# Patient Record
Sex: MALE | NOT HISPANIC OR LATINO | Employment: FULL TIME | URBAN - METROPOLITAN AREA
[De-identification: names, ages, dates, MRNs, and addresses within clinical notes are randomized per-mention and may not be internally consistent; named-entity substitution may affect disease eponyms.]

---

## 2022-09-07 ENCOUNTER — APPOINTMENT (OUTPATIENT)
Dept: GENERAL RADIOLOGY | Facility: CLINIC | Age: 47
DRG: 501 | End: 2022-09-07
Attending: EMERGENCY MEDICINE

## 2022-09-07 ENCOUNTER — HOSPITAL ENCOUNTER (INPATIENT)
Facility: CLINIC | Age: 47
LOS: 3 days | Discharge: HOME | DRG: 501 | End: 2022-09-10
Attending: EMERGENCY MEDICINE | Admitting: INTERNAL MEDICINE

## 2022-09-07 DIAGNOSIS — Z11.52 ENCOUNTER FOR SCREENING LABORATORY TESTING FOR SEVERE ACUTE RESPIRATORY SYNDROME CORONAVIRUS 2 (SARS-COV-2): ICD-10-CM

## 2022-09-07 DIAGNOSIS — L03.113 CELLULITIS OF RIGHT UPPER EXTREMITY: ICD-10-CM

## 2022-09-07 DIAGNOSIS — M71.121 SEPTIC OLECRANON BURSITIS OF RIGHT ELBOW: Primary | ICD-10-CM

## 2022-09-07 LAB
ALBUMIN SERPL-MCNC: 3.4 G/DL (ref 3.4–5)
ALP SERPL-CCNC: 62 U/L (ref 40–150)
ALT SERPL W P-5'-P-CCNC: 24 U/L (ref 0–70)
ANION GAP SERPL CALCULATED.3IONS-SCNC: 7 MMOL/L (ref 3–14)
AST SERPL W P-5'-P-CCNC: 25 U/L (ref 0–45)
BASOPHILS # BLD AUTO: 0 10E3/UL (ref 0–0.2)
BASOPHILS NFR BLD AUTO: 0 %
BILIRUB SERPL-MCNC: 1.2 MG/DL (ref 0.2–1.3)
BUN SERPL-MCNC: 18 MG/DL (ref 7–30)
CALCIUM SERPL-MCNC: 8.3 MG/DL (ref 8.5–10.1)
CHLORIDE BLD-SCNC: 104 MMOL/L (ref 94–109)
CO2 SERPL-SCNC: 26 MMOL/L (ref 20–32)
CREAT SERPL-MCNC: 1.07 MG/DL (ref 0.66–1.25)
EOSINOPHIL # BLD AUTO: 0.1 10E3/UL (ref 0–0.7)
EOSINOPHIL NFR BLD AUTO: 1 %
ERYTHROCYTE [DISTWIDTH] IN BLOOD BY AUTOMATED COUNT: 13.2 % (ref 10–15)
GFR SERPL CREATININE-BSD FRML MDRD: 85 ML/MIN/1.73M2
GLUCOSE BLD-MCNC: 123 MG/DL (ref 70–99)
HCT VFR BLD AUTO: 39.5 % (ref 40–53)
HGB BLD-MCNC: 13.1 G/DL (ref 13.3–17.7)
IMM GRANULOCYTES # BLD: 0.1 10E3/UL
IMM GRANULOCYTES NFR BLD: 0 %
LACTATE SERPL-SCNC: 1.6 MMOL/L (ref 0.7–2)
LYMPHOCYTES # BLD AUTO: 1.5 10E3/UL (ref 0.8–5.3)
LYMPHOCYTES NFR BLD AUTO: 10 %
MCH RBC QN AUTO: 30.6 PG (ref 26.5–33)
MCHC RBC AUTO-ENTMCNC: 33.2 G/DL (ref 31.5–36.5)
MCV RBC AUTO: 92 FL (ref 78–100)
MONOCYTES # BLD AUTO: 1.5 10E3/UL (ref 0–1.3)
MONOCYTES NFR BLD AUTO: 9 %
NEUTROPHILS # BLD AUTO: 12.3 10E3/UL (ref 1.6–8.3)
NEUTROPHILS NFR BLD AUTO: 80 %
NRBC # BLD AUTO: 0 10E3/UL
NRBC BLD AUTO-RTO: 0 /100
PLATELET # BLD AUTO: 283 10E3/UL (ref 150–450)
POTASSIUM BLD-SCNC: 3.2 MMOL/L (ref 3.4–5.3)
PROT SERPL-MCNC: 6.9 G/DL (ref 6.8–8.8)
RBC # BLD AUTO: 4.28 10E6/UL (ref 4.4–5.9)
SARS-COV-2 RNA RESP QL NAA+PROBE: NEGATIVE
SODIUM SERPL-SCNC: 137 MMOL/L (ref 133–144)
WBC # BLD AUTO: 15.4 10E3/UL (ref 4–11)

## 2022-09-07 PROCEDURE — 250N000011 HC RX IP 250 OP 636: Performed by: EMERGENCY MEDICINE

## 2022-09-07 PROCEDURE — 99285 EMERGENCY DEPT VISIT HI MDM: CPT | Mod: 25 | Performed by: EMERGENCY MEDICINE

## 2022-09-07 PROCEDURE — 87040 BLOOD CULTURE FOR BACTERIA: CPT | Performed by: EMERGENCY MEDICINE

## 2022-09-07 PROCEDURE — 73070 X-RAY EXAM OF ELBOW: CPT | Mod: RT

## 2022-09-07 PROCEDURE — 96367 TX/PROPH/DG ADDL SEQ IV INF: CPT | Performed by: EMERGENCY MEDICINE

## 2022-09-07 PROCEDURE — 99285 EMERGENCY DEPT VISIT HI MDM: CPT | Performed by: EMERGENCY MEDICINE

## 2022-09-07 PROCEDURE — 83605 ASSAY OF LACTIC ACID: CPT | Performed by: EMERGENCY MEDICINE

## 2022-09-07 PROCEDURE — 87635 SARS-COV-2 COVID-19 AMP PRB: CPT | Performed by: EMERGENCY MEDICINE

## 2022-09-07 PROCEDURE — 120N000001 HC R&B MED SURG/OB

## 2022-09-07 PROCEDURE — 85025 COMPLETE CBC W/AUTO DIFF WBC: CPT | Performed by: EMERGENCY MEDICINE

## 2022-09-07 PROCEDURE — 96374 THER/PROPH/DIAG INJ IV PUSH: CPT | Mod: 59 | Performed by: EMERGENCY MEDICINE

## 2022-09-07 PROCEDURE — 258N000003 HC RX IP 258 OP 636: Mod: JZ | Performed by: EMERGENCY MEDICINE

## 2022-09-07 PROCEDURE — C9803 HOPD COVID-19 SPEC COLLECT: HCPCS | Performed by: EMERGENCY MEDICINE

## 2022-09-07 PROCEDURE — 96365 THER/PROPH/DIAG IV INF INIT: CPT | Performed by: EMERGENCY MEDICINE

## 2022-09-07 PROCEDURE — 82040 ASSAY OF SERUM ALBUMIN: CPT | Performed by: EMERGENCY MEDICINE

## 2022-09-07 PROCEDURE — 36415 COLL VENOUS BLD VENIPUNCTURE: CPT | Performed by: EMERGENCY MEDICINE

## 2022-09-07 RX ORDER — HYDROMORPHONE HYDROCHLORIDE 1 MG/ML
0.5 INJECTION, SOLUTION INTRAMUSCULAR; INTRAVENOUS; SUBCUTANEOUS
Status: DISCONTINUED | OUTPATIENT
Start: 2022-09-07 | End: 2022-09-08

## 2022-09-07 RX ORDER — VANCOMYCIN HYDROCHLORIDE 1 G/200ML
1000 INJECTION, SOLUTION INTRAVENOUS EVERY 12 HOURS
Status: DISCONTINUED | OUTPATIENT
Start: 2022-09-08 | End: 2022-09-10

## 2022-09-07 RX ORDER — LIDOCAINE 40 MG/G
CREAM TOPICAL
Status: DISCONTINUED | OUTPATIENT
Start: 2022-09-07 | End: 2022-09-10

## 2022-09-07 RX ORDER — HYDROMORPHONE HCL IN WATER/PF 6 MG/30 ML
0.2 PATIENT CONTROLLED ANALGESIA SYRINGE INTRAVENOUS
Status: DISCONTINUED | OUTPATIENT
Start: 2022-09-07 | End: 2022-09-08

## 2022-09-07 RX ORDER — VANCOMYCIN HYDROCHLORIDE 1 G/200ML
1000 INJECTION, SOLUTION INTRAVENOUS ONCE
Status: COMPLETED | OUTPATIENT
Start: 2022-09-07 | End: 2022-09-07

## 2022-09-07 RX ORDER — ONDANSETRON 2 MG/ML
4 INJECTION INTRAMUSCULAR; INTRAVENOUS EVERY 6 HOURS PRN
Status: DISCONTINUED | OUTPATIENT
Start: 2022-09-07 | End: 2022-09-08

## 2022-09-07 RX ORDER — ONDANSETRON 4 MG/1
4 TABLET, ORALLY DISINTEGRATING ORAL EVERY 6 HOURS PRN
Status: DISCONTINUED | OUTPATIENT
Start: 2022-09-07 | End: 2022-09-08

## 2022-09-07 RX ORDER — ACETAMINOPHEN 650 MG/1
650 SUPPOSITORY RECTAL EVERY 6 HOURS PRN
Status: DISCONTINUED | OUTPATIENT
Start: 2022-09-07 | End: 2022-09-08

## 2022-09-07 RX ORDER — SODIUM CHLORIDE 9 MG/ML
INJECTION, SOLUTION INTRAVENOUS CONTINUOUS
Status: DISCONTINUED | OUTPATIENT
Start: 2022-09-07 | End: 2022-09-10

## 2022-09-07 RX ORDER — ACETAMINOPHEN 325 MG/1
650 TABLET ORAL EVERY 6 HOURS PRN
Status: DISCONTINUED | OUTPATIENT
Start: 2022-09-07 | End: 2022-09-08

## 2022-09-07 RX ADMIN — SODIUM CHLORIDE, PRESERVATIVE FREE: 5 INJECTION INTRAVENOUS at 23:16

## 2022-09-07 RX ADMIN — TAZOBACTAM SODIUM AND PIPERACILLIN SODIUM 4.5 G: 500; 4 INJECTION, SOLUTION INTRAVENOUS at 21:26

## 2022-09-07 RX ADMIN — SODIUM CHLORIDE 1000 ML: 9 INJECTION, SOLUTION INTRAVENOUS at 21:21

## 2022-09-07 RX ADMIN — VANCOMYCIN HYDROCHLORIDE 1000 MG: 1 INJECTION, SOLUTION INTRAVENOUS at 22:14

## 2022-09-07 RX ADMIN — HYDROMORPHONE HYDROCHLORIDE 0.5 MG: 1 INJECTION, SOLUTION INTRAMUSCULAR; INTRAVENOUS; SUBCUTANEOUS at 22:00

## 2022-09-07 RX ADMIN — SODIUM CHLORIDE 1000 ML: 9 INJECTION, SOLUTION INTRAVENOUS at 22:16

## 2022-09-07 ASSESSMENT — ACTIVITIES OF DAILY LIVING (ADL)
ADLS_ACUITY_SCORE: 35
TOILETING_ISSUES: NO
WEAR_GLASSES_OR_BLIND: NO
DIFFICULTY_EATING/SWALLOWING: NO
FALL_HISTORY_WITHIN_LAST_SIX_MONTHS: NO
HEARING_DIFFICULTY_OR_DEAF: NO
WALKING_OR_CLIMBING_STAIRS_DIFFICULTY: NO
ADLS_ACUITY_SCORE: 35
CHANGE_IN_FUNCTIONAL_STATUS_SINCE_ONSET_OF_CURRENT_ILLNESS/INJURY: NO
DOING_ERRANDS_INDEPENDENTLY_DIFFICULTY: NO
DIFFICULTY_COMMUNICATING: NO
DRESSING/BATHING_DIFFICULTY: NO
CONCENTRATING,_REMEMBERING_OR_MAKING_DECISIONS_DIFFICULTY: NO

## 2022-09-08 ENCOUNTER — TELEPHONE (OUTPATIENT)
Dept: ORTHOPEDICS | Facility: CLINIC | Age: 47
End: 2022-09-08

## 2022-09-08 ENCOUNTER — ANESTHESIA (OUTPATIENT)
Dept: SURGERY | Facility: CLINIC | Age: 47
DRG: 501 | End: 2022-09-08

## 2022-09-08 ENCOUNTER — ANESTHESIA EVENT (OUTPATIENT)
Dept: SURGERY | Facility: CLINIC | Age: 47
DRG: 501 | End: 2022-09-08

## 2022-09-08 DIAGNOSIS — M71.121 SEPTIC OLECRANON BURSITIS OF RIGHT ELBOW: Primary | ICD-10-CM

## 2022-09-08 PROBLEM — E87.6 HYPOKALEMIA: Status: ACTIVE | Noted: 2022-09-08

## 2022-09-08 PROBLEM — F10.20 ALCOHOLISM (H): Status: ACTIVE | Noted: 2022-09-08

## 2022-09-08 LAB
ANION GAP SERPL CALCULATED.3IONS-SCNC: 5 MMOL/L (ref 3–14)
BUN SERPL-MCNC: 17 MG/DL (ref 7–30)
CALCIUM SERPL-MCNC: 7.7 MG/DL (ref 8.5–10.1)
CHLORIDE BLD-SCNC: 107 MMOL/L (ref 94–109)
CO2 SERPL-SCNC: 26 MMOL/L (ref 20–32)
CREAT SERPL-MCNC: 0.93 MG/DL (ref 0.66–1.25)
CRP SERPL-MCNC: 111 MG/L (ref 0–8)
ERYTHROCYTE [DISTWIDTH] IN BLOOD BY AUTOMATED COUNT: 13.2 % (ref 10–15)
GFR SERPL CREATININE-BSD FRML MDRD: >90 ML/MIN/1.73M2
GLUCOSE BLD-MCNC: 123 MG/DL (ref 70–99)
HCT VFR BLD AUTO: 36.8 % (ref 40–53)
HGB BLD-MCNC: 12.2 G/DL (ref 13.3–17.7)
MCH RBC QN AUTO: 31 PG (ref 26.5–33)
MCHC RBC AUTO-ENTMCNC: 33.2 G/DL (ref 31.5–36.5)
MCV RBC AUTO: 94 FL (ref 78–100)
PLATELET # BLD AUTO: 244 10E3/UL (ref 150–450)
POTASSIUM BLD-SCNC: 3.7 MMOL/L (ref 3.4–5.3)
POTASSIUM BLD-SCNC: 3.7 MMOL/L (ref 3.4–5.3)
RBC # BLD AUTO: 3.93 10E6/UL (ref 4.4–5.9)
SODIUM SERPL-SCNC: 138 MMOL/L (ref 133–144)
WBC # BLD AUTO: 13.3 10E3/UL (ref 4–11)

## 2022-09-08 PROCEDURE — 99207 PR APP CREDIT; MD BILLING SHARED VISIT: CPT | Performed by: NURSE PRACTITIONER

## 2022-09-08 PROCEDURE — 87205 SMEAR GRAM STAIN: CPT | Performed by: ORTHOPAEDIC SURGERY

## 2022-09-08 PROCEDURE — 250N000011 HC RX IP 250 OP 636: Mod: JZ | Performed by: INTERNAL MEDICINE

## 2022-09-08 PROCEDURE — 250N000011 HC RX IP 250 OP 636: Performed by: EMERGENCY MEDICINE

## 2022-09-08 PROCEDURE — 120N000001 HC R&B MED SURG/OB

## 2022-09-08 PROCEDURE — 250N000011 HC RX IP 250 OP 636: Performed by: ORTHOPAEDIC SURGERY

## 2022-09-08 PROCEDURE — 24105 EXCISION OLECRANON BURSA: CPT | Mod: RT | Performed by: ORTHOPAEDIC SURGERY

## 2022-09-08 PROCEDURE — 250N000009 HC RX 250: Performed by: ORTHOPAEDIC SURGERY

## 2022-09-08 PROCEDURE — 258N000003 HC RX IP 258 OP 636: Performed by: NURSE ANESTHETIST, CERTIFIED REGISTERED

## 2022-09-08 PROCEDURE — 250N000026 HC DESFLURANE, PER MIN: Performed by: ORTHOPAEDIC SURGERY

## 2022-09-08 PROCEDURE — 0MB30ZZ EXCISION OF RIGHT ELBOW BURSA AND LIGAMENT, OPEN APPROACH: ICD-10-PCS | Performed by: ORTHOPAEDIC SURGERY

## 2022-09-08 PROCEDURE — 258N000003 HC RX IP 258 OP 636: Performed by: EMERGENCY MEDICINE

## 2022-09-08 PROCEDURE — 999N000141 HC STATISTIC PRE-PROCEDURE NURSING ASSESSMENT: Performed by: ORTHOPAEDIC SURGERY

## 2022-09-08 PROCEDURE — 85027 COMPLETE CBC AUTOMATED: CPT | Performed by: INTERNAL MEDICINE

## 2022-09-08 PROCEDURE — 250N000013 HC RX MED GY IP 250 OP 250 PS 637: Performed by: ORTHOPAEDIC SURGERY

## 2022-09-08 PROCEDURE — 36415 COLL VENOUS BLD VENIPUNCTURE: CPT | Performed by: INTERNAL MEDICINE

## 2022-09-08 PROCEDURE — 99223 1ST HOSP IP/OBS HIGH 75: CPT | Mod: AI | Performed by: INTERNAL MEDICINE

## 2022-09-08 PROCEDURE — 87186 SC STD MICRODIL/AGAR DIL: CPT | Performed by: ORTHOPAEDIC SURGERY

## 2022-09-08 PROCEDURE — 80048 BASIC METABOLIC PNL TOTAL CA: CPT | Performed by: INTERNAL MEDICINE

## 2022-09-08 PROCEDURE — 360N000076 HC SURGERY LEVEL 3, PER MIN: Performed by: ORTHOPAEDIC SURGERY

## 2022-09-08 PROCEDURE — 250N000011 HC RX IP 250 OP 636: Performed by: NURSE ANESTHETIST, CERTIFIED REGISTERED

## 2022-09-08 PROCEDURE — 86140 C-REACTIVE PROTEIN: CPT | Performed by: NURSE PRACTITIONER

## 2022-09-08 PROCEDURE — 87075 CULTR BACTERIA EXCEPT BLOOD: CPT | Performed by: ORTHOPAEDIC SURGERY

## 2022-09-08 PROCEDURE — 250N000011 HC RX IP 250 OP 636: Mod: JZ | Performed by: NURSE PRACTITIONER

## 2022-09-08 PROCEDURE — 258N000003 HC RX IP 258 OP 636: Performed by: ORTHOPAEDIC SURGERY

## 2022-09-08 PROCEDURE — 250N000013 HC RX MED GY IP 250 OP 250 PS 637: Performed by: INTERNAL MEDICINE

## 2022-09-08 PROCEDURE — 710N000010 HC RECOVERY PHASE 1, LEVEL 2, PER MIN: Performed by: ORTHOPAEDIC SURGERY

## 2022-09-08 PROCEDURE — 250N000009 HC RX 250: Performed by: NURSE ANESTHETIST, CERTIFIED REGISTERED

## 2022-09-08 PROCEDURE — 272N000001 HC OR GENERAL SUPPLY STERILE: Performed by: ORTHOPAEDIC SURGERY

## 2022-09-08 PROCEDURE — 370N000017 HC ANESTHESIA TECHNICAL FEE, PER MIN: Performed by: ORTHOPAEDIC SURGERY

## 2022-09-08 RX ORDER — OXYCODONE HYDROCHLORIDE 5 MG/1
5 TABLET ORAL EVERY 4 HOURS PRN
Status: DISCONTINUED | OUTPATIENT
Start: 2022-09-08 | End: 2022-09-08 | Stop reason: HOSPADM

## 2022-09-08 RX ORDER — ONDANSETRON 2 MG/ML
4 INJECTION INTRAMUSCULAR; INTRAVENOUS EVERY 30 MIN PRN
Status: DISCONTINUED | OUTPATIENT
Start: 2022-09-08 | End: 2022-09-08 | Stop reason: HOSPADM

## 2022-09-08 RX ORDER — LIDOCAINE HYDROCHLORIDE 20 MG/ML
INJECTION, SOLUTION INFILTRATION; PERINEURAL PRN
Status: DISCONTINUED | OUTPATIENT
Start: 2022-09-08 | End: 2022-09-08

## 2022-09-08 RX ORDER — ONDANSETRON 4 MG/1
4 TABLET, ORALLY DISINTEGRATING ORAL EVERY 30 MIN PRN
Status: DISCONTINUED | OUTPATIENT
Start: 2022-09-08 | End: 2022-09-08 | Stop reason: HOSPADM

## 2022-09-08 RX ORDER — KETOROLAC TROMETHAMINE 30 MG/ML
INJECTION, SOLUTION INTRAMUSCULAR; INTRAVENOUS PRN
Status: DISCONTINUED | OUTPATIENT
Start: 2022-09-08 | End: 2022-09-08

## 2022-09-08 RX ORDER — HYDROMORPHONE HCL IN WATER/PF 6 MG/30 ML
0.2 PATIENT CONTROLLED ANALGESIA SYRINGE INTRAVENOUS
Status: DISCONTINUED | OUTPATIENT
Start: 2022-09-08 | End: 2022-09-10

## 2022-09-08 RX ORDER — SODIUM CHLORIDE, SODIUM LACTATE, POTASSIUM CHLORIDE, CALCIUM CHLORIDE 600; 310; 30; 20 MG/100ML; MG/100ML; MG/100ML; MG/100ML
INJECTION, SOLUTION INTRAVENOUS CONTINUOUS
Status: DISCONTINUED | OUTPATIENT
Start: 2022-09-08 | End: 2022-09-08 | Stop reason: HOSPADM

## 2022-09-08 RX ORDER — CEFAZOLIN SODIUM/WATER 2 G/20 ML
2 SYRINGE (ML) INTRAVENOUS EVERY 8 HOURS
Status: DISCONTINUED | OUTPATIENT
Start: 2022-09-08 | End: 2022-09-08 | Stop reason: ALTCHOICE

## 2022-09-08 RX ORDER — ONDANSETRON 2 MG/ML
INJECTION INTRAMUSCULAR; INTRAVENOUS PRN
Status: DISCONTINUED | OUTPATIENT
Start: 2022-09-08 | End: 2022-09-08

## 2022-09-08 RX ORDER — FENTANYL CITRATE 50 UG/ML
INJECTION, SOLUTION INTRAMUSCULAR; INTRAVENOUS PRN
Status: DISCONTINUED | OUTPATIENT
Start: 2022-09-08 | End: 2022-09-08

## 2022-09-08 RX ORDER — ACETAMINOPHEN 325 MG/1
650 TABLET ORAL EVERY 4 HOURS PRN
Status: DISCONTINUED | OUTPATIENT
Start: 2022-09-11 | End: 2022-09-10

## 2022-09-08 RX ORDER — LIDOCAINE 40 MG/G
CREAM TOPICAL
Status: DISCONTINUED | OUTPATIENT
Start: 2022-09-08 | End: 2022-09-08 | Stop reason: HOSPADM

## 2022-09-08 RX ORDER — ASPIRIN 81 MG/1
81 TABLET ORAL 2 TIMES DAILY
Status: DISCONTINUED | OUTPATIENT
Start: 2022-09-09 | End: 2022-09-10 | Stop reason: HOSPADM

## 2022-09-08 RX ORDER — MEPERIDINE HYDROCHLORIDE 25 MG/ML
12.5 INJECTION INTRAMUSCULAR; INTRAVENOUS; SUBCUTANEOUS
Status: DISCONTINUED | OUTPATIENT
Start: 2022-09-08 | End: 2022-09-08 | Stop reason: HOSPADM

## 2022-09-08 RX ORDER — PROPOFOL 10 MG/ML
INJECTION, EMULSION INTRAVENOUS PRN
Status: DISCONTINUED | OUTPATIENT
Start: 2022-09-08 | End: 2022-09-08

## 2022-09-08 RX ORDER — FENTANYL CITRATE 50 UG/ML
25 INJECTION, SOLUTION INTRAMUSCULAR; INTRAVENOUS
Status: DISCONTINUED | OUTPATIENT
Start: 2022-09-08 | End: 2022-09-08 | Stop reason: HOSPADM

## 2022-09-08 RX ORDER — ACETAMINOPHEN 325 MG/1
975 TABLET ORAL EVERY 8 HOURS
Status: DISCONTINUED | OUTPATIENT
Start: 2022-09-08 | End: 2022-09-10

## 2022-09-08 RX ORDER — OXYCODONE HYDROCHLORIDE 5 MG/1
10 TABLET ORAL EVERY 4 HOURS PRN
Status: DISCONTINUED | OUTPATIENT
Start: 2022-09-08 | End: 2022-09-10

## 2022-09-08 RX ORDER — METOPROLOL TARTRATE 1 MG/ML
1-2 INJECTION, SOLUTION INTRAVENOUS EVERY 5 MIN PRN
Status: DISCONTINUED | OUTPATIENT
Start: 2022-09-08 | End: 2022-09-08 | Stop reason: HOSPADM

## 2022-09-08 RX ORDER — ONDANSETRON 2 MG/ML
4 INJECTION INTRAMUSCULAR; INTRAVENOUS EVERY 6 HOURS PRN
Status: DISCONTINUED | OUTPATIENT
Start: 2022-09-08 | End: 2022-09-10 | Stop reason: HOSPADM

## 2022-09-08 RX ORDER — PROCHLORPERAZINE MALEATE 5 MG
10 TABLET ORAL EVERY 6 HOURS PRN
Status: DISCONTINUED | OUTPATIENT
Start: 2022-09-08 | End: 2022-09-10 | Stop reason: HOSPADM

## 2022-09-08 RX ORDER — CEFAZOLIN SODIUM 1 G/3ML
INJECTION, POWDER, FOR SOLUTION INTRAMUSCULAR; INTRAVENOUS PRN
Status: DISCONTINUED | OUTPATIENT
Start: 2022-09-08 | End: 2022-09-08

## 2022-09-08 RX ORDER — BISACODYL 10 MG
10 SUPPOSITORY, RECTAL RECTAL DAILY PRN
Status: DISCONTINUED | OUTPATIENT
Start: 2022-09-08 | End: 2022-09-10 | Stop reason: HOSPADM

## 2022-09-08 RX ORDER — ALBUTEROL SULFATE 0.83 MG/ML
2.5 SOLUTION RESPIRATORY (INHALATION) EVERY 4 HOURS PRN
Status: DISCONTINUED | OUTPATIENT
Start: 2022-09-08 | End: 2022-09-08 | Stop reason: HOSPADM

## 2022-09-08 RX ORDER — NALOXONE HYDROCHLORIDE 0.4 MG/ML
0.2 INJECTION, SOLUTION INTRAMUSCULAR; INTRAVENOUS; SUBCUTANEOUS
Status: DISCONTINUED | OUTPATIENT
Start: 2022-09-08 | End: 2022-09-10 | Stop reason: HOSPADM

## 2022-09-08 RX ORDER — NALOXONE HYDROCHLORIDE 0.4 MG/ML
0.4 INJECTION, SOLUTION INTRAMUSCULAR; INTRAVENOUS; SUBCUTANEOUS
Status: DISCONTINUED | OUTPATIENT
Start: 2022-09-08 | End: 2022-09-10 | Stop reason: HOSPADM

## 2022-09-08 RX ORDER — SODIUM CHLORIDE, SODIUM LACTATE, POTASSIUM CHLORIDE, CALCIUM CHLORIDE 600; 310; 30; 20 MG/100ML; MG/100ML; MG/100ML; MG/100ML
INJECTION, SOLUTION INTRAVENOUS CONTINUOUS
Status: DISCONTINUED | OUTPATIENT
Start: 2022-09-08 | End: 2022-09-10

## 2022-09-08 RX ORDER — DIMENHYDRINATE 50 MG/ML
INJECTION, SOLUTION INTRAMUSCULAR; INTRAVENOUS PRN
Status: DISCONTINUED | OUTPATIENT
Start: 2022-09-08 | End: 2022-09-08

## 2022-09-08 RX ORDER — POTASSIUM CHLORIDE 1500 MG/1
40 TABLET, EXTENDED RELEASE ORAL ONCE
Status: COMPLETED | OUTPATIENT
Start: 2022-09-08 | End: 2022-09-08

## 2022-09-08 RX ORDER — ONDANSETRON 4 MG/1
4 TABLET, ORALLY DISINTEGRATING ORAL EVERY 6 HOURS PRN
Status: DISCONTINUED | OUTPATIENT
Start: 2022-09-08 | End: 2022-09-10 | Stop reason: HOSPADM

## 2022-09-08 RX ORDER — LIDOCAINE 40 MG/G
CREAM TOPICAL
Status: DISCONTINUED | OUTPATIENT
Start: 2022-09-08 | End: 2022-09-10 | Stop reason: HOSPADM

## 2022-09-08 RX ORDER — HYDROMORPHONE HYDROCHLORIDE 1 MG/ML
0.2 INJECTION, SOLUTION INTRAMUSCULAR; INTRAVENOUS; SUBCUTANEOUS EVERY 5 MIN PRN
Status: DISCONTINUED | OUTPATIENT
Start: 2022-09-08 | End: 2022-09-08 | Stop reason: HOSPADM

## 2022-09-08 RX ORDER — AMOXICILLIN 250 MG
1 CAPSULE ORAL 2 TIMES DAILY
Status: DISCONTINUED | OUTPATIENT
Start: 2022-09-08 | End: 2022-09-10 | Stop reason: HOSPADM

## 2022-09-08 RX ORDER — OXYCODONE HYDROCHLORIDE 5 MG/1
5 TABLET ORAL EVERY 4 HOURS PRN
Status: DISCONTINUED | OUTPATIENT
Start: 2022-09-08 | End: 2022-09-10

## 2022-09-08 RX ORDER — HYDROMORPHONE HCL IN WATER/PF 6 MG/30 ML
.2-.5 PATIENT CONTROLLED ANALGESIA SYRINGE INTRAVENOUS
Status: DISCONTINUED | OUTPATIENT
Start: 2022-09-08 | End: 2022-09-08

## 2022-09-08 RX ORDER — HYDRALAZINE HYDROCHLORIDE 20 MG/ML
2.5-5 INJECTION INTRAMUSCULAR; INTRAVENOUS EVERY 10 MIN PRN
Status: DISCONTINUED | OUTPATIENT
Start: 2022-09-08 | End: 2022-09-08 | Stop reason: HOSPADM

## 2022-09-08 RX ORDER — HYDROMORPHONE HCL IN WATER/PF 6 MG/30 ML
0.4 PATIENT CONTROLLED ANALGESIA SYRINGE INTRAVENOUS
Status: DISCONTINUED | OUTPATIENT
Start: 2022-09-08 | End: 2022-09-10

## 2022-09-08 RX ORDER — POLYETHYLENE GLYCOL 3350 17 G/17G
17 POWDER, FOR SOLUTION ORAL DAILY
Status: DISCONTINUED | OUTPATIENT
Start: 2022-09-09 | End: 2022-09-10 | Stop reason: HOSPADM

## 2022-09-08 RX ORDER — FENTANYL CITRATE 50 UG/ML
25 INJECTION, SOLUTION INTRAMUSCULAR; INTRAVENOUS EVERY 5 MIN PRN
Status: DISCONTINUED | OUTPATIENT
Start: 2022-09-08 | End: 2022-09-08 | Stop reason: HOSPADM

## 2022-09-08 RX ADMIN — FENTANYL CITRATE 50 MCG: 50 INJECTION, SOLUTION INTRAMUSCULAR; INTRAVENOUS at 17:12

## 2022-09-08 RX ADMIN — TAZOBACTAM SODIUM AND PIPERACILLIN SODIUM 4.5 G: 500; 4 INJECTION, SOLUTION INTRAVENOUS at 03:26

## 2022-09-08 RX ADMIN — HYDROMORPHONE HYDROCHLORIDE 0.2 MG: 0.2 INJECTION, SOLUTION INTRAMUSCULAR; INTRAVENOUS; SUBCUTANEOUS at 09:52

## 2022-09-08 RX ADMIN — SENNOSIDES AND DOCUSATE SODIUM 1 TABLET: 50; 8.6 TABLET ORAL at 20:05

## 2022-09-08 RX ADMIN — SODIUM CHLORIDE, PRESERVATIVE FREE: 5 INJECTION INTRAVENOUS at 08:15

## 2022-09-08 RX ADMIN — CEFAZOLIN 2 G: 1 INJECTION, POWDER, FOR SOLUTION INTRAMUSCULAR; INTRAVENOUS at 17:09

## 2022-09-08 RX ADMIN — HYDROMORPHONE HYDROCHLORIDE 0.4 MG: 0.2 INJECTION, SOLUTION INTRAMUSCULAR; INTRAVENOUS; SUBCUTANEOUS at 22:41

## 2022-09-08 RX ADMIN — HYDROMORPHONE HYDROCHLORIDE 0.2 MG: 0.2 INJECTION, SOLUTION INTRAMUSCULAR; INTRAVENOUS; SUBCUTANEOUS at 07:29

## 2022-09-08 RX ADMIN — FENTANYL CITRATE 50 MCG: 50 INJECTION, SOLUTION INTRAMUSCULAR; INTRAVENOUS at 18:17

## 2022-09-08 RX ADMIN — ACETAMINOPHEN 975 MG: 325 TABLET, FILM COATED ORAL at 20:05

## 2022-09-08 RX ADMIN — FENTANYL CITRATE 50 MCG: 50 INJECTION, SOLUTION INTRAMUSCULAR; INTRAVENOUS at 18:22

## 2022-09-08 RX ADMIN — TAZOBACTAM SODIUM AND PIPERACILLIN SODIUM 4.5 G: 500; 4 INJECTION, SOLUTION INTRAVENOUS at 21:40

## 2022-09-08 RX ADMIN — KETOROLAC TROMETHAMINE 30 MG: 30 INJECTION, SOLUTION INTRAMUSCULAR at 18:20

## 2022-09-08 RX ADMIN — ONDANSETRON 4 MG: 2 INJECTION INTRAMUSCULAR; INTRAVENOUS at 17:59

## 2022-09-08 RX ADMIN — HYDROMORPHONE HYDROCHLORIDE 0.2 MG: 0.2 INJECTION, SOLUTION INTRAMUSCULAR; INTRAVENOUS; SUBCUTANEOUS at 03:30

## 2022-09-08 RX ADMIN — HYDROMORPHONE HYDROCHLORIDE 0.5 MG: 0.2 INJECTION, SOLUTION INTRAMUSCULAR; INTRAVENOUS; SUBCUTANEOUS at 13:14

## 2022-09-08 RX ADMIN — VANCOMYCIN HYDROCHLORIDE 1000 MG: 1 INJECTION, SOLUTION INTRAVENOUS at 22:41

## 2022-09-08 RX ADMIN — SODIUM CHLORIDE, POTASSIUM CHLORIDE, SODIUM LACTATE AND CALCIUM CHLORIDE: 600; 310; 30; 20 INJECTION, SOLUTION INTRAVENOUS at 17:04

## 2022-09-08 RX ADMIN — SODIUM CHLORIDE, POTASSIUM CHLORIDE, SODIUM LACTATE AND CALCIUM CHLORIDE: 600; 310; 30; 20 INJECTION, SOLUTION INTRAVENOUS at 20:10

## 2022-09-08 RX ADMIN — VANCOMYCIN HYDROCHLORIDE 1000 MG: 1 INJECTION, SOLUTION INTRAVENOUS at 09:53

## 2022-09-08 RX ADMIN — HYDROMORPHONE HYDROCHLORIDE 0.5 MG: 0.2 INJECTION, SOLUTION INTRAMUSCULAR; INTRAVENOUS; SUBCUTANEOUS at 16:09

## 2022-09-08 RX ADMIN — HYDROMORPHONE HYDROCHLORIDE 0.2 MG: 0.2 INJECTION, SOLUTION INTRAMUSCULAR; INTRAVENOUS; SUBCUTANEOUS at 20:05

## 2022-09-08 RX ADMIN — DIMENHYDRINATE 25 MG: 50 INJECTION, SOLUTION INTRAMUSCULAR; INTRAVENOUS at 17:25

## 2022-09-08 RX ADMIN — TAZOBACTAM SODIUM AND PIPERACILLIN SODIUM 4.5 G: 500; 4 INJECTION, SOLUTION INTRAVENOUS at 14:07

## 2022-09-08 RX ADMIN — PROPOFOL 200 MG: 10 INJECTION, EMULSION INTRAVENOUS at 17:21

## 2022-09-08 RX ADMIN — FENTANYL CITRATE 50 MCG: 50 INJECTION, SOLUTION INTRAMUSCULAR; INTRAVENOUS at 18:37

## 2022-09-08 RX ADMIN — POTASSIUM CHLORIDE 40 MEQ: 1500 TABLET, EXTENDED RELEASE ORAL at 00:14

## 2022-09-08 RX ADMIN — HYDROMORPHONE HYDROCHLORIDE 0.5 MG: 1 INJECTION, SOLUTION INTRAMUSCULAR; INTRAVENOUS; SUBCUTANEOUS at 00:02

## 2022-09-08 RX ADMIN — TAZOBACTAM SODIUM AND PIPERACILLIN SODIUM 4.5 G: 500; 4 INJECTION, SOLUTION INTRAVENOUS at 08:15

## 2022-09-08 RX ADMIN — MIDAZOLAM 2 MG: 1 INJECTION INTRAMUSCULAR; INTRAVENOUS at 17:12

## 2022-09-08 RX ADMIN — LIDOCAINE HYDROCHLORIDE 60 MG: 20 INJECTION, SOLUTION INFILTRATION; PERINEURAL at 17:20

## 2022-09-08 ASSESSMENT — ACTIVITIES OF DAILY LIVING (ADL)
ADLS_ACUITY_SCORE: 18

## 2022-09-08 ASSESSMENT — LIFESTYLE VARIABLES: TOBACCO_USE: 0

## 2022-09-08 NOTE — PLAN OF CARE
"Goal Outcome Evaluation:    Plan of Care Reviewed With: patient     Overall Patient Progress: no change    Outcome Evaluation: Pt. A&O x4. VSS, maintaining 02 saturations on RA. No fevers this shift. RUE warm to the touch, swollen, tender; describes pain as throbbing \"head rush feeling but in the elbow\" with activity. Receiving IV dilaudid for pain, 0.2 mg administered x2, 0.5 mg administered x1, see MAR. Pt. remains NPO for surgery scheduled at 1700. Pt. Up ad vlad in room.       "

## 2022-09-08 NOTE — ANESTHESIA PROCEDURE NOTES
Airway       Patient location during procedure: OR  Staff -        CRNA: Eder Smith APRN CRNA       Performed By: CRNA  Consent for Airway        Urgency: elective  Indications and Patient Condition       Indications for airway management: regan-procedural       Induction type:intravenous       Mask difficulty assessment: 1 - vent by mask    Final Airway Details       Final airway type: supraglottic airway    Supraglottic Airway Details        Type: LMA       Brand: LMA Unique       LMA size: 5    Post intubation assessment        Placement verified by: capnometry, equal breath sounds and chest rise        Number of attempts at approach: 1       Number of other approaches attempted: 0       Secured with: plastic tape       Ease of procedure: easy       Dentition: Intact and Unchanged

## 2022-09-08 NOTE — OR NURSING
Transfer from  pacu to Room back to [facility]  Transferred to bed via glyder sheet    S: 46 y/o m  S/P right olecranon bursectomy       Anesthesia Type:  general       Surgeon:  Dr. Sarkar       Allergies:  See Medication Reconciliation Record       DNR: no  (Yes,No)    B:  Pertinent Medical History: Pt. Admitted yesterday to hospital after suffering and injury to his arm   No past medical history on file.           Surgical History:  No past surgical history on file.    A:  EBL: less than 5 ml        IVF:  LR: 500 ml given in OR additional 150 in pacu        UOP: void pre-op        NPO:  ___Yes X___No         Vomiting:  ___Yes _X__No         Drainage: new hemovac drain right arm, light red return        Skin Integrity: new bandage on right arm,         RFO: __X_Yes___No drain right elbow hemovac       Brace/sling/equipment:  _X__Yes___No        Room air saturations consistent >92       [facility]       See PACU record for ongoing assessment, vital signs and pain assessment.        Pain: sore, received fentanyl from anesthesia on arrival to pacu        CMS: right fingers: intact, no numbness or tingling  R: Post-Op vitals and assessments as ordered/indicated per patient's condition.       Follow Post-Op orders and notify Physician prn.       Continue to involve patient/family in plan of care and discharge planning.       Reinforce Pre-Operative education.       Implement skin safety interventions as appropriate.    Name: Lam DIAZ

## 2022-09-08 NOTE — ED TRIAGE NOTES
"Pt presents with left elbow pain and swelling. Pt states he \"banged it on something,\" Pt golfed on yesterday. Pt pale and diaphoretic  BP 70's in triage. Roomed immediately. IV started. MD to room.      Triage Assessment     Row Name 09/07/22 2059       Triage Assessment (Adult)    Airway WDL WDL       Respiratory WDL    Respiratory WDL WDL       Skin Circulation/Temperature WDL    Skin Circulation/Temperature WDL X;all  Pale and diaphretic               "

## 2022-09-08 NOTE — PROGRESS NOTES
S-(situation): Patient arrives to room 256 via cart from ED    B-(background): Right elbow septic bursitis    A-(assessment): Pt is alert and oriented. Very pleasant and talkative. Reports significant pain in right arm with any movement. Elevation also increases pain. Small area open and draining purulent drainage on elbow. Dilaudid given upon arrival. Potassium slightly low and replaced. Pt will be NPO for possible I & D Thursday.    R-(recommendations): Orders reviewed with patient. Will monitor patient per MD orders.     Inpatient nursing criteria listed below were met:    Health care directives status obtained and documented: Yes  SCD's Documented: Yes  Skin issues/needs documented:Yes  Isolation addressed and Signage used: NA  Fall Prevention: Care plan updated NA Education given and documented NA  Care Plan initiated and Co-Morbidities added: Yes  Education Assessment documented:Yes  Admission Education Documented: Yes  If present CAUTI/CLABI Education done: NA  New medication patient education completed and documented (Possible Side Effects of Common Medications handout): Yes  Allergies Reviewed: Yes  Admission Medication Reconciliation completed: Yes  Home medications if not able to send immediately home with family stored here: NA  Reminder note placed in discharge instructions regarding home meds: NA  Individualized care needs/preferences addressed and charted: No  Provider Notified that patient has arrived to the unit: Yes

## 2022-09-08 NOTE — PHARMACY-VANCOMYCIN DOSING SERVICE
Pharmacy Vancomycin Initial Note  Date of Service 2022  Patient's  1975  47 year old, male    Indication: Sepsis    Current estimated CrCl = Estimated Creatinine Clearance: 95.9 mL/min (based on SCr of 1.07 mg/dL).    Creatinine for last 3 days  2022:  9:23 PM Creatinine 1.07 mg/dL    Recent Vancomycin Level(s) for last 3 days  No results found for requested labs within last 72 hours.      Vancomycin IV Administrations (past 72 hours)                   vancomycin (VANCOCIN) 1000 mg in dextrose 5% 200 mL PREMIX (mg) 1,000 mg New Bag 22                Nephrotoxins and other renal medications (From now, onward)    Start     Dose/Rate Route Frequency Ordered Stop    22 1100  vancomycin (VANCOCIN) 1000 mg in dextrose 5% 200 mL PREMIX         1,000 mg  200 mL/hr over 1 Hours Intravenous EVERY 12 HOURS 22 22122  vancomycin (VANCOCIN) 1000 mg in dextrose 5% 200 mL PREMIX         1,000 mg  200 mL/hr over 1 Hours Intravenous ONCE 22 211            Contrast Orders - past 72 hours (72h ago, onward)    None          [employer] Prediction of Planned Initial Vancomycin Regimen  Loading dose: N/A  Regimen: 1000 mg IV every 12 hours.  Start time: 22:13 on 2022  Exposure target: AUC24 (range)400-600 mg/L.hr   AUC24,ss: 511 mg/L.hr  Probability of AUC24 > 400: 76 %  Ctrough,ss: 16.3 mg/L  Probability of Ctrough,ss > 20: 31 %  Probability of nephrotoxicity (Lodise TIA ): 12 %        Plan:  1. Start vancomycin  1000 mg IV q12h.   2. Vancomycin monitoring method: AUC  3. Vancomycin therapeutic monitoring goal: 400-600 mg*h/L  4. Pharmacy will check vancomycin levels as appropriate in 1-3 Days.    5. Serum creatinine levels will be ordered daily for the first week of therapy and at least twice weekly for subsequent weeks.      Otis Sarmiento, [facility]

## 2022-09-08 NOTE — H&P
"[facility]    History and Physical - Hospitalist Service       Date of Admission:  9/7/2022    Assessment & Plan    right elbow septic arthritis; sepsis  -cont Vanco and Zosyn  -blood cultures pending  -BP low in ED, responded to IVF; normal lactic acid  -Ortho planning I and D later today    Alcohol misuse  -drinks 36 beers/week  -denies h/o withdrawal    Hypokalemia  -replacement protocol ordered       Diet: NPO per Anesthesia Guidelines for Procedure/Surgery Except for: Meds, Ice Chips    DVT Prophylaxis: SCDs  Feng Catheter: Not present  Central Lines: None  Code Status:  FULL    Clinically Significant Risk Factors Present on Admission           # Overweight: Estimated body mass index is 26.59 kg/m  as calculated from the following:    Height as of this encounter: 1.778 m (5' 10\").    Weight as of this encounter: 84.1 kg (185 lb 4.8 oz).        Disposition Plan      The patient's care was discussed with the patient.        VINAY LANE MD  [facility]  Securely message with the Vocera Web Console (learn more here)  Text page via [employer] Paging/Directory      Visit/Communication Style   Virtual (Video) communication was used to evaluate Arlet Humphries consented to the use of video communication: yes  Video START time:0045 , 9/8/2022  Video STOP time:0035 , 9/8/2022   Patient's location: [facility]   Provider's location during the visit: [facility] Tele-medicine site        ______________________________________________________________________    Chief Complaint   Right arm pain and swelling    History of Present Illness    48yoM with no significant history presented to the ED with right elbow pain, swelling, redness and drainage.    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  He continued to work but the pain and swelling got much worse yesterday after golfing.  " The wound drainage is yellow-green.  He denies fever or chills    Review of Systems    General: negative for fever, chills, sweats, weakness  Eyes: negative for blurred vision, loss of vision  Ear Nose and Throat: negative for pharyngitis, speech or swallowing difficulties  Respiratory:  negative for sputum production, wheezing, SIMON, pleuritic pain, sob or cough  Cardiology:  negative for chest pain, palpitations, orthopnea, PND, edema, syncope   Gastrointestinal: negative for abdominal pain, nausea, vomiting, diarrhea, constipation, hematemesis, melena or hematochezia  Genitourinary: negative for frequency, urgency, dysuria, hematuria   Neurological: negative for focal weakness, paresthesia    Past Medical History    I have reviewed this patient's medical history and updated it with pertinent information if needed.   No past medical history on file.       Past Surgical History     Knee and shoulder surgery    Social History   I have reviewed this patient's social history and updated it with pertinent information if needed.  Drinks 36 beers/week  Smokes a few cigarettes per day; has cut back over last year    Prior to Admission Medications   None     Allergies   Allergies   Allergen Reactions     No Known Allergies        Physical Exam   Vital Signs: Temp: 97.8  F (36.6  C) Temp src: Oral BP: 132/57 Pulse: 87   Resp: 19 SpO2: 97 %      Weight: 185 lbs 4.8 oz    Equipment not functioning for ausculation  Gen:  Well-developed, well-nourished, in no acute distress, lying semi-supine in hospital stretcher  HEENT:  Anicteric sclera, PER, hearing intact to voice  Resp:  No accessory muscle use  Musc:  Normal strength and movement of the major muscle groups without obvious deformity  Psych:  Good insight, oriented to person, place and time, not anxious, not agitated  Ext: right arm-extensive swelling, redness from hand to elbow; bandage on right elbow wound    Data     Recent Labs   Lab 09/07/22 2123   WBC 15.4*   HGB  13.1*   MCV 92         POTASSIUM 3.2*   CHLORIDE 104   CO2 26   BUN 18   CR 1.07   ANIONGAP 7   OLMAN 8.3*   *   ALBUMIN 3.4   PROTTOTAL 6.9   BILITOTAL 1.2   ALKPHOS 62   ALT 24   AST 25         Recent Results (from the past 24 hour(s))   Elbow XR, 2 views, right    Narrative    EXAM: XR ELBOW RIGHT 2 VIEWS  LOCATION: [facility]  DATE/TIME: 9/7/2022 10:10 PM    INDICATION: Injury. Right elbow pain.  COMPARISON: None.      Impression    IMPRESSION: Diffuse soft tissue edema. Degenerative change of the elbow. Olecranon spurring. Cortical deformity of the distal humerus on the AP view laterally presumably remote fracture. If there is persistent concern for acute injury then short-term   follow-up suggested.

## 2022-09-08 NOTE — CONSULTS
[facility] Orthopedic Consultation    Yandel Norris MRN# [id number]   Age: 47 year old YOB: 1975     Date of Admission:  9/7/2022    Reason for consult: Right olecranon septic bursitis.       Requesting physician: Dr. Charles Olmstead        Level of consult: Consult, follow and place orders           Assessment and Plan:   Assessment:   Right septic olecranon bursitis with drainage      Plan:   I+D right olecranon bursitis with bursectomy.  Risks, benefits, potential complications and alternatives were discussed.            Chief Complaint:   Right elbow infection.       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  He golfed twice in the next few days.  His elbow and forearm became swollen and tender.  Seen in emergency room yesterday with septic olecranon bursitis found.  He was admitted for treatment.           Past Medical History:   No past medical history on file.          Past Surgical History:   No past surgical history on file.          Social History:     Social History     Tobacco Use     Smoking status: Not on file     Smokeless tobacco: Not on file   Substance Use Topics     Alcohol use: Not on file             Family History:   No family history on file.  Family history reviewed          Immunizations:     There is no immunization history on file for this patient.          Allergies:     Allergies   Allergen Reactions     No Known Allergies              Medications:     No medications prior to admission.             Review of Systems:   The Review of Systems is negative other than noted in the HPI          Physical Exam:   Temp: 100.3  F (37.9  C) Temp src: Core BP: (!) 151/93 Pulse: 89   Resp: 16 SpO2: 94 % O2 Device: None (Room air)     All vitals have been reviewed  Musculoskeletal:   RIGHT ELBOW:  redness present at bursa  warmth present  swelling present on upper arm and forearm.  tenderness present especially at olecranon bursa  Small area draining at bursa.             Data:     Lab Results   Component Value Date    WBC 13.3 (H) 09/08/2022    HGB 12.2 (L) 09/08/2022    HCT 36.8 (L) 09/08/2022     09/08/2022     09/08/2022    POTASSIUM 3.7 09/08/2022    POTASSIUM 3.7 09/08/2022    CHLORIDE 107 09/08/2022    CO2 26 09/08/2022    BUN 17 09/08/2022    CR 0.93 09/08/2022     (H) 09/08/2022    AST 25 09/07/2022    ALT 24 09/07/2022    ALKPHOS 62 09/07/2022    BILITOTAL 1.2 09/07/2022    C-reactive protein is 111.    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     Attestation:  Amount of time performed on this consult: 40 minutes.    Emir Sarkar MD

## 2022-09-08 NOTE — ANESTHESIA CARE TRANSFER NOTE
Patient: Yandel Norris    Procedure: Procedure(s):  BURSECTOMY, OLECRANON       Diagnosis: Septic olecranon bursitis of right elbow [M71.121]  Diagnosis Additional Information: No value filed.    Anesthesia Type:   General     Note:    Oropharynx: oropharynx clear of all foreign objects and spontaneously breathing  Level of Consciousness: drowsy  Oxygen Supplementation: face mask    Independent Airway: airway patency satisfactory and stable  Dentition: dentition unchanged  Vital Signs Stable: post-procedure vital signs reviewed and stable  Report to RN Given: handoff report given  Patient transferred to: ICU (in icu for staffing only)    ICU Handoff: Call for PAUSE to initiate/utilize ICU HANDOFF, Identified Patient, Identified Responsible Provider, Reviewed the Pertinent Medical History, Discussed Surgical Course, Reviewed Intra-OP Anesthesia Management and Issues during Anesthesia, Set Expectations for Post Procedure Period and Allowed Opportunity for Questions and Acknowledgement of Understanding      Vitals:  Vitals Value Taken Time   BP     Temp     Pulse     Resp     SpO2         Electronically Signed By: TORSTEN Melendez CRNA  September 8, 2022  6:45 PM

## 2022-09-08 NOTE — TELEPHONE ENCOUNTER
Type of surgery: BURSECTOMY, OLECRANON (Right)     Location of surgery: [facility]  Date and time of surgery: 9/8/22  Surgeon: Dr. Sarkar   Pre-Op Appt Date: emergent   Post-Op Appt Date: emergent    Packet sent out: Not Applicable  Pre-cert/Authorization completed:    Date:

## 2022-09-08 NOTE — PLAN OF CARE
Goal Outcome Evaluation:    Plan of Care Reviewed With: patient     Overall Patient Progress: no change    Outcome Evaluation: Right arm and hand edematous and very sore. Elevation causes more pain. Elbow has a small open area that is draining. Dilaudid helpful with the pain. He has been NPO since midnight for possible procedure today. Vitals WNL since arrival to floor.

## 2022-09-08 NOTE — UTILIZATION REVIEW
Admission Status; Secondary Review Determination    Under the authority of the Utilization Management Committee, the utilization review process indicated a secondary review on the above patient. The review outcome is based on review of the medical records, discussions with staff, and applying clinical experience noted on the date of the review.    (x) Inpatient Status Appropriate - This patient's medical care is consistent with medical management for inpatient care and reasonable inpatient medical practice.    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  Patient found to have right elbow remarkably swollen diffusely with a small open wound draining purulent material with associated significant leukocytosis with left shift, initial hypotension.  Patient felt to have clinically septic bursitis of the right olecranon requiring broad-spectrum IV antibiotics with probable need for incision and drainage and several day hospital management appropriate for inpatient care.    At the time of admission with the information available to the attending physician more than 2 nights Hospital complex care was anticipated, based on patient risk of adverse outcome if treated as outpatient and complex care required. Inpatient admission is appropriate based on the Medicare guidelines.    This document was produced using voice recognition software    The information on this document is developed by the utilization review team in order for the business office to ensure compliance. This only denotes the appropriateness of proper admission status and does not reflect the quality of care rendered.    The definitions of Inpatient Status and Observation Status used in making the determination above are those provided in the CMS Coverage Manual, Chapter 1 and Chapter 6,  section 70.4.    Sincerely,    Surendra Cadet MD  Utilization Review  Physician Advisor  [facility].

## 2022-09-08 NOTE — ANESTHESIA PREPROCEDURE EVALUATION
Anesthesia Pre-Procedure Evaluation    Patient: Yandel Norris   MRN: [id number] : 1975        Procedure : Procedure(s):  BURSECTOMY, OLECRANON          No past medical history on file.   No past surgical history on file.   Allergies   Allergen Reactions     No Known Allergies       Social History     Tobacco Use     Smoking status: Not on file     Smokeless tobacco: Not on file   Substance Use Topics     Alcohol use: Not on file      Wt Readings from Last 1 Encounters:   22 84.1 kg (185 lb 4.8 oz)        Anesthesia Evaluation   Pt has had prior anesthetic. Type: General and MAC.    No history of anesthetic complications       ROS/MED HX  ENT/Pulmonary:  - neg pulmonary ROS  (-) tobacco use   Neurologic:  - neg neurologic ROS     Cardiovascular:  - neg cardiovascular ROS   (+) -----No previous cardiac testing     METS/Exercise Tolerance:     Hematologic:  - neg hematologic  ROS     Musculoskeletal: Comment: Right Elbow infection and pain      GI/Hepatic:  - neg GI/hepatic ROS  (-) GERD   Renal/Genitourinary:  - neg Renal ROS     Endo:  - neg endo ROS     Psychiatric/Substance Use:     (+) alcohol abuse     Infectious Disease: Comment: Right elbow Infection at this time      Malignancy:  - neg malignancy ROS     Other:  - neg other ROS          Physical Exam    Airway        Mallampati: II   TM distance: > 3 FB   Neck ROM: full   Mouth opening: > 3 cm    Respiratory Devices and Support         Dental  no notable dental history         Cardiovascular   cardiovascular exam normal       Rhythm and rate: regular and normal     Pulmonary   pulmonary exam normal        breath sounds clear to auscultation           OUTSIDE LABS:  CBC:   Lab Results   Component Value Date    WBC 13.3 (H) 2022    WBC 15.4 (H) 2022    HGB 12.2 (L) 2022    HGB 13.1 (L) 2022    HCT 36.8 (L) 2022    HCT 39.5 (L) 2022     2022     2022     BMP:   Lab Results   Component  Value Date     09/08/2022     09/07/2022    POTASSIUM 3.7 09/08/2022    POTASSIUM 3.7 09/08/2022    CHLORIDE 107 09/08/2022    CHLORIDE 104 09/07/2022    CO2 26 09/08/2022    CO2 26 09/07/2022    BUN 17 09/08/2022    BUN 18 09/07/2022    CR 0.93 09/08/2022    CR 1.07 09/07/2022     (H) 09/08/2022     (H) 09/07/2022     COAGS: No results found for: PTT, INR, FIBR  POC: No results found for: BGM, HCG, HCGS  HEPATIC:   Lab Results   Component Value Date    ALBUMIN 3.4 09/07/2022    PROTTOTAL 6.9 09/07/2022    ALT 24 09/07/2022    AST 25 09/07/2022    ALKPHOS 62 09/07/2022    BILITOTAL 1.2 09/07/2022     OTHER:   Lab Results   Component Value Date    LACT 1.6 09/07/2022    OLMAN 7.7 (L) 09/08/2022       Anesthesia Plan    ASA Status:  2   NPO Status:  NPO Appropriate    Anesthesia Type: General.     - Airway: LMA   Induction: Intravenous, Propofol.   Maintenance: Balanced.        Consents    Anesthesia Plan(s) and associated risks, benefits, and realistic alternatives discussed. Questions answered and patient/representative(s) expressed understanding.    - Discussed:     - Discussed with:  Patient      - Extended Intubation/Ventilatory Support Discussed: No.      - Patient is DNR/DNI Status: No    Use of blood products discussed: No .     Postoperative Care    Pain management: IV analgesics, Oral pain medications.     - Plan for long acting post-op opioid use   PONV prophylaxis: Ondansetron (or other 5HT-3), Meclizine or Dimenhydrinate     Comments:    Other Comments: The risks and benefits of anesthesia, and the alternatives where applicable, have been discussed with the patient, and they wish to proceed.            TORSTEN Melendez CRNA

## 2022-09-08 NOTE — INTERVAL H&P NOTE
"I have reviewed the surgical (or preoperative) H&P that is linked to this encounter, and examined the patient. There are no significant changes    Clinical Conditions Present on Arrival:  Clinically Significant Risk Factors Present on Admission            # Hypocalcemia: Ca = 7.7 mg/dL (Ref range: 8.5 - 10.1 mg/dL) and/or iCa = N/A on admission, will replace as needed        # Overweight: Estimated body mass index is 26.59 kg/m  as calculated from the following:    Height as of this encounter: 1.778 m (5' 10\").    Weight as of this encounter: 84.1 kg (185 lb 4.8 oz).       "

## 2022-09-08 NOTE — BRIEF OP NOTE
[facility] Orthopedic Brief Operative Note    Pre-operative diagnosis: Septic olecranon bursitis of right elbow [M71.121]   Post-operative diagnosis: Same   Procedure: Incision and drainage of septic bursitis with excisional bursectomy.   Surgeon: Emir Sarkar MD   Assistant(s): None   Anesthesia: General endotracheal anesthesia   Estimated blood loss: 10 cc   Total IV fluids: (See anesthesia record)   Blood transfusion: No transfusion was given during surgery   Total urine output: (See anesthesia record)   Drains: Hemovac   Specimens: Cultures obtained   Implants: None   Findings: Purulence in bursa with septic bursal wall. 3 full thickness areas of skin erosion into bursa.   Complications: None   Condition: Stable   Weight bearing status: Weight bearing as tolerated   Activity: Activity as tolerated  Patient may move about with assist as indicated or with supervision   Orthotic management: Not applicable   Comments: See dictated operative report for full details

## 2022-09-08 NOTE — PROGRESS NOTES
[facility]    Medicine Progress Note - Hospitalist Service    Date of Admission:  9/7/2022    Assessment & Plan          Yandel Norris is a 48 year old male who presents with severe right elbow pain.  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  Over the weekend he was able to play golf and socialize with family but noticed that on Tuesday 9/6 his right elbow began to have edema and increased pain.  On 9/7 he presented to the emergency room with decreased movement and uncontrolled pain within his right elbow.      Active Problems:    Cellulitis of right upper extremity    Septic olecranon bursitis of right elbow    Zosyn and vancomycin initiated in the emergency room    Continuing Zosyn    Pharmacy to dose vancomycin    Orthopedics has been consulted to evaluate for I&D of right elbow    Dilaudid as needed for analgesic    N.p.o. until evaluated by orthopedics    Normal saline 125 an hour    WBC 15.4, lactate 1.6 on arrival    Repeat CBC, BMP, CRP in a.m.      Hypokalemia    Replace per protocol      Alcoholism (H)    Drinks approximately 36 beers per week    Denies any withdrawal symptoms or history of         Diet: NPO per Anesthesia Guidelines for Procedure/Surgery Except for: Meds, Ice Chips    DVT Prophylaxis: Pneumatic Compression Devices  Feng Catheter: Not present  Central Lines: None  Cardiac Monitoring: None  Code Status: Full Code      Disposition Plan         The patient's care was discussed with the Attending Physician, Dr. Olmstead, Bedside Nurse and Care Coordinator/.    Servando Mccray, NP  Hospitalist Service  [facility]  Securely message with the Vocera Web Console (learn more here)  Text page via [employer] Paging/Directory         Clinically Significant Risk Factors Present on Admission          # Hypocalcemia: Ca = 7.7 mg/dL (Ref range: 8.5 - 10.1 mg/dL) and/or iCa = N/A on admission, will replace  "as needed           # Overweight: Estimated body mass index is 26.59 kg/m  as calculated from the following:    Height as of this encounter: 1.778 m (5' 10\").    Weight as of this encounter: 84.1 kg (185 lb 4.8 oz).        ______________________________________________________________________    Interval History   Patient continues to have right elbow pain that is increased with movement.  Orthopedics evaluation is pending.  Patient remains afebrile at this time    Data reviewed today: I reviewed all medications, new labs and imaging results over the last 24 hours. I personally reviewed no images or EKG's today.    Physical Exam   Vital Signs: Temp: 98.6  F (37  C) Temp src: Oral BP: 116/58 Pulse: 79   Resp: 24 SpO2: 95 % O2 Device: None (Room air)    Weight: 185 lbs 4.8 oz  Constitutional: awake, alert, cooperative, no apparent distress, and appears stated age  ENT: normocepalic, without obvious abnormality, atramatic  Respiratory: No increased work of breathing, good air exchange, clear to auscultation bilaterally, no crackles or wheezing  Cardiovascular: normal apical pulses  and normal S1 and S2  GI: normal bowel sounds, non-distended and non-tender  Skin: normal skin color, texture, turgor and RUE is edematous with erythema around the elbow.  Minimal drainage from right elbow  Musculoskeletal: no lower extremity pitting edema present  there is no redness, warmth, or swelling of the joints  with exception of  RIGHT ELBOW:  redness present  swelling present  tenderness present  range of motion is significantly decreased  Neurologic: Awake, alert, oriented to name, place and time.  Cranial nerves II-XII are grossly intact.  Motor is 5 out of 5 bilaterally.      Data   Recent Labs   Lab 09/08/22  0550 09/07/22 2123   WBC 13.3* 15.4*   HGB 12.2* 13.1*   MCV 94 92    283    137   POTASSIUM 3.7  3.7 3.2*   CHLORIDE 107 104   CO2 26 26   BUN 17 18   CR 0.93 1.07   ANIONGAP 5 7   OLMAN 7.7* 8.3*   * " 123*   ALBUMIN  --  3.4   PROTTOTAL  --  6.9   BILITOTAL  --  1.2   ALKPHOS  --  62   ALT  --  24   AST  --  25     Recent Results (from the past 24 hour(s))   Elbow XR, 2 views, right    Narrative    EXAM: XR ELBOW RIGHT 2 VIEWS  LOCATION: [facility]  DATE/TIME: 9/7/2022 10:10 PM    INDICATION: Injury. Right elbow pain.  COMPARISON: None.      Impression    IMPRESSION: Diffuse soft tissue edema. Degenerative change of the elbow. Olecranon spurring. Cortical deformity of the distal humerus on the AP view laterally presumably remote fracture. If there is persistent concern for acute injury then short-term   follow-up suggested.     Medications     sodium chloride 125 mL/hr at 09/08/22 0815       piperacillin-tazobactam  4.5 g Intravenous Q6H     sodium chloride (PF)  3 mL Intracatheter Q8H     vancomycin  1,000 mg Intravenous Q12H

## 2022-09-08 NOTE — ED PROVIDER NOTES
"  History     Chief Complaint   Patient presents with     Elbow Pain     HPI  Yandel Norris is a 48 year old male who presents with severe right elbow pain.  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  He did not think much of it then.  He stated he might of suffered a couple of abrasions.  He is golf twice since then.  However the elbow has swelled up considerably and it has become quite painful.  No known fever.    Allergies:  Allergies   Allergen Reactions     No Known Allergies        Problem List:    Patient Active Problem List    Diagnosis Date Noted     Cellulitis of right upper extremity 09/07/2022     Priority: Medium     Septic olecranon bursitis of right elbow 09/07/2022     Priority: Medium        Past Medical History:    No past medical history on file.    Past Surgical History:    No past surgical history on file.    Family History:    No family history on file.    Social History:  Marital Status:  Single [1]        Medications:    No current outpatient medications on file.        Review of Systems  All other systems are reviewed and are negative    Physical Exam   BP: (!) 76/47  Pulse: 71  Temp: 98.8  F (37.1  C)  Resp: 16  Height: 177.8 cm (5' 10\") (from 's license)  Weight: 81.2 kg (179 lb)  SpO2: 95 %      Physical Exam  Vitals and nursing note reviewed.   Constitutional:       General: He is in acute distress.      Appearance: He is well-developed. He is diaphoretic.   HENT:      Head: Normocephalic and atraumatic.   Eyes:      General: No scleral icterus.        Right eye: No discharge.         Left eye: No discharge.      Conjunctiva/sclera: Conjunctivae normal.   Cardiovascular:      Rate and Rhythm: Normal rate and regular rhythm.      Heart sounds: Normal heart sounds. No murmur heard.  Pulmonary:      Effort: Pulmonary effort is normal. No respiratory distress.      Breath sounds: Normal breath sounds. No stridor.   Abdominal:      Palpations: Abdomen is " soft.      Tenderness: There is no abdominal tenderness.   Musculoskeletal:      Cervical back: Normal range of motion and neck supple.      Comments: Right elbow remarkably swollen diffusely.  Over the olecranon there is a small open wound draining what appears to be purulent material.  CMS to the hand intact   Skin:     General: Skin is warm.      Coloration: Skin is not pale.      Findings: No erythema or rash.   Neurological:      Mental Status: He is alert.      Cranial Nerves: No cranial nerve deficit.      Motor: No abnormal muscle tone.         ED Course                 Procedures              Critical Care time:  none               Results for orders placed or performed during the hospital encounter of 09/07/22 (from the past 24 hour(s))   CBC with platelets differential    Narrative    The following orders were created for panel order CBC with platelets differential.  Procedure                               Abnormality         Status                     ---------                               -----------         ------                     CBC with platelets and d...[[id number]]  Abnormal            Final result                 Please view results for these tests on the individual orders.   Comprehensive metabolic panel   Result Value Ref Range    Sodium 137 133 - 144 mmol/L    Potassium 3.2 (L) 3.4 - 5.3 mmol/L    Chloride 104 94 - 109 mmol/L    Carbon Dioxide (CO2) 26 20 - 32 mmol/L    Anion Gap 7 3 - 14 mmol/L    Urea Nitrogen 18 7 - 30 mg/dL    Creatinine 1.07 0.66 - 1.25 mg/dL    Calcium 8.3 (L) 8.5 - 10.1 mg/dL    Glucose 123 (H) 70 - 99 mg/dL    Alkaline Phosphatase 62 40 - 150 U/L    AST 25 0 - 45 U/L    ALT 24 0 - 70 U/L    Protein Total 6.9 6.8 - 8.8 g/dL    Albumin 3.4 3.4 - 5.0 g/dL    Bilirubin Total 1.2 0.2 - 1.3 mg/dL    GFR Estimate 85 >60 mL/min/1.73m2   Lactic acid whole blood   Result Value Ref Range    Lactic Acid 1.6 0.7 - 2.0 mmol/L   CBC with platelets and differential   Result Value  Ref Range    WBC Count 15.4 (H) 4.0 - 11.0 10e3/uL    RBC Count 4.28 (L) 4.40 - 5.90 10e6/uL    Hemoglobin 13.1 (L) 13.3 - 17.7 g/dL    Hematocrit 39.5 (L) 40.0 - 53.0 %    MCV 92 78 - 100 fL    MCH 30.6 26.5 - 33.0 pg    MCHC 33.2 31.5 - 36.5 g/dL    RDW 13.2 10.0 - 15.0 %    Platelet Count 283 150 - 450 10e3/uL    % Neutrophils 80 %    % Lymphocytes 10 %    % Monocytes 9 %    % Eosinophils 1 %    % Basophils 0 %    % Immature Granulocytes 0 %    NRBCs per 100 WBC 0 <1 /100    Absolute Neutrophils 12.3 (H) 1.6 - 8.3 10e3/uL    Absolute Lymphocytes 1.5 0.8 - 5.3 10e3/uL    Absolute Monocytes 1.5 (H) 0.0 - 1.3 10e3/uL    Absolute Eosinophils 0.1 0.0 - 0.7 10e3/uL    Absolute Basophils 0.0 0.0 - 0.2 10e3/uL    Absolute Immature Granulocytes 0.1 <=0.4 10e3/uL    Absolute NRBCs 0.0 10e3/uL   Elbow XR, 2 views, right    Narrative    EXAM: XR ELBOW RIGHT 2 VIEWS  LOCATION: [facility]  DATE/TIME: 9/7/2022 10:10 PM    INDICATION: Injury. Right elbow pain.  COMPARISON: None.      Impression    IMPRESSION: Diffuse soft tissue edema. Degenerative change of the elbow. Olecranon spurring. Cortical deformity of the distal humerus on the AP view laterally presumably remote fracture. If there is persistent concern for acute injury then short-term   follow-up suggested.       Medications   0.9% sodium chloride BOLUS (0 mLs Intravenous Stopped 9/7/22 2215)     Followed by   0.9% sodium chloride BOLUS (1,000 mLs Intravenous New Bag 9/7/22 2216)     Followed by   sodium chloride 0.9% infusion (has no administration in time range)   vancomycin (VANCOCIN) 1000 mg in dextrose 5% 200 mL PREMIX (1,000 mg Intravenous New Bag 9/7/22 2214)   HYDROmorphone (PF) (DILAUDID) injection 0.5 mg (0.5 mg Intravenous Given 9/7/22 2200)   vancomycin (VANCOCIN) 1000 mg in dextrose 5% 200 mL PREMIX (has no administration in time range)   piperacillin-tazobactam (ZOSYN) intermittent infusion 4.5 g (0 g Intravenous Stopped  9/7/22 4739)       Assessments & Plan (with Medical Decision Making)  47-year-old male with septic bursitis of the right olecranon region.  Initiated on broad-spectrum IV antibiotics with Zosyn and vancomycin.  Will admit to the hospitalist service as discussed with Dr. Lau.  Case reviewed with the on-call orthopedist, Dr. Sarkar.  His plan is to review things tomorrow and likely take to the OR for incision and drainage tomorrow.  Distal humeral cortical irregularity will need further review, suspect in light of history this is potentially remote.     I have reviewed the nursing notes.    I have reviewed the findings, diagnosis, plan and need for follow up with the patient.       New Prescriptions    No medications on file       Final diagnoses:   Cellulitis of right upper extremity   Septic olecranon bursitis of right elbow       9/7/2022   [facility] EMERGENCY DEPT     Adolfo Garcia MD  09/07/22 1593

## 2022-09-09 LAB
ANION GAP SERPL CALCULATED.3IONS-SCNC: 5 MMOL/L (ref 3–14)
BUN SERPL-MCNC: 16 MG/DL (ref 7–30)
CALCIUM SERPL-MCNC: 8.1 MG/DL (ref 8.5–10.1)
CHLORIDE BLD-SCNC: 108 MMOL/L (ref 94–109)
CO2 SERPL-SCNC: 25 MMOL/L (ref 20–32)
CREAT SERPL-MCNC: 0.97 MG/DL (ref 0.66–1.25)
CRP SERPL-MCNC: 77.2 MG/L (ref 0–8)
ERYTHROCYTE [DISTWIDTH] IN BLOOD BY AUTOMATED COUNT: 13.3 % (ref 10–15)
GFR SERPL CREATININE-BSD FRML MDRD: >90 ML/MIN/1.73M2
GLUCOSE BLD-MCNC: 132 MG/DL (ref 70–99)
GRAM STAIN RESULT: ABNORMAL
GRAM STAIN RESULT: ABNORMAL
HCT VFR BLD AUTO: 34.9 % (ref 40–53)
HGB BLD-MCNC: 11.6 G/DL (ref 13.3–17.7)
MCH RBC QN AUTO: 31.5 PG (ref 26.5–33)
MCHC RBC AUTO-ENTMCNC: 33.2 G/DL (ref 31.5–36.5)
MCV RBC AUTO: 95 FL (ref 78–100)
PLATELET # BLD AUTO: 254 10E3/UL (ref 150–450)
POTASSIUM BLD-SCNC: 4.2 MMOL/L (ref 3.4–5.3)
RBC # BLD AUTO: 3.68 10E6/UL (ref 4.4–5.9)
SODIUM SERPL-SCNC: 138 MMOL/L (ref 133–144)
WBC # BLD AUTO: 9.4 10E3/UL (ref 4–11)

## 2022-09-09 PROCEDURE — 120N000001 HC R&B MED SURG/OB

## 2022-09-09 PROCEDURE — 250N000011 HC RX IP 250 OP 636: Performed by: EMERGENCY MEDICINE

## 2022-09-09 PROCEDURE — 250N000011 HC RX IP 250 OP 636: Performed by: ORTHOPAEDIC SURGERY

## 2022-09-09 PROCEDURE — 36415 COLL VENOUS BLD VENIPUNCTURE: CPT | Performed by: NURSE PRACTITIONER

## 2022-09-09 PROCEDURE — 250N000011 HC RX IP 250 OP 636: Performed by: INTERNAL MEDICINE

## 2022-09-09 PROCEDURE — 250N000013 HC RX MED GY IP 250 OP 250 PS 637: Performed by: ORTHOPAEDIC SURGERY

## 2022-09-09 PROCEDURE — 99233 SBSQ HOSP IP/OBS HIGH 50: CPT | Performed by: NURSE PRACTITIONER

## 2022-09-09 PROCEDURE — 86140 C-REACTIVE PROTEIN: CPT | Performed by: NURSE PRACTITIONER

## 2022-09-09 PROCEDURE — 85027 COMPLETE CBC AUTOMATED: CPT | Performed by: NURSE PRACTITIONER

## 2022-09-09 PROCEDURE — 80048 BASIC METABOLIC PNL TOTAL CA: CPT | Performed by: NURSE PRACTITIONER

## 2022-09-09 RX ORDER — ACETAMINOPHEN 325 MG/1
650 TABLET ORAL EVERY 4 HOURS PRN
Qty: 100 TABLET | Refills: 0 | Status: SHIPPED | OUTPATIENT
Start: 2022-09-09

## 2022-09-09 RX ORDER — ACETAMINOPHEN 325 MG/1
650 TABLET ORAL EVERY 4 HOURS PRN
Qty: 100 TABLET | Refills: 0 | Status: SHIPPED | OUTPATIENT
Start: 2022-09-11

## 2022-09-09 RX ORDER — AMOXICILLIN 250 MG
1 CAPSULE ORAL 2 TIMES DAILY
Qty: 30 TABLET | Refills: 1 | Status: SHIPPED | OUTPATIENT
Start: 2022-09-09

## 2022-09-09 RX ORDER — OXYCODONE HYDROCHLORIDE 5 MG/1
5-10 TABLET ORAL EVERY 4 HOURS PRN
Qty: 25 TABLET | Refills: 0 | Status: SHIPPED | OUTPATIENT
Start: 2022-09-09

## 2022-09-09 RX ADMIN — ACETAMINOPHEN 975 MG: 325 TABLET, FILM COATED ORAL at 11:11

## 2022-09-09 RX ADMIN — OXYCODONE HYDROCHLORIDE 10 MG: 5 TABLET ORAL at 08:44

## 2022-09-09 RX ADMIN — OXYCODONE HYDROCHLORIDE 10 MG: 5 TABLET ORAL at 21:28

## 2022-09-09 RX ADMIN — ASPIRIN 81 MG: 81 TABLET, COATED ORAL at 20:21

## 2022-09-09 RX ADMIN — POLYETHYLENE GLYCOL 3350 17 G: 17 POWDER, FOR SOLUTION ORAL at 08:29

## 2022-09-09 RX ADMIN — HYDROMORPHONE HYDROCHLORIDE 0.2 MG: 0.2 INJECTION, SOLUTION INTRAMUSCULAR; INTRAVENOUS; SUBCUTANEOUS at 06:24

## 2022-09-09 RX ADMIN — TAZOBACTAM SODIUM AND PIPERACILLIN SODIUM 4.5 G: 500; 4 INJECTION, SOLUTION INTRAVENOUS at 15:12

## 2022-09-09 RX ADMIN — ASPIRIN 81 MG: 81 TABLET, COATED ORAL at 08:29

## 2022-09-09 RX ADMIN — TAZOBACTAM SODIUM AND PIPERACILLIN SODIUM 4.5 G: 500; 4 INJECTION, SOLUTION INTRAVENOUS at 08:29

## 2022-09-09 RX ADMIN — HYDROMORPHONE HYDROCHLORIDE 0.2 MG: 0.2 INJECTION, SOLUTION INTRAMUSCULAR; INTRAVENOUS; SUBCUTANEOUS at 20:21

## 2022-09-09 RX ADMIN — OXYCODONE HYDROCHLORIDE 10 MG: 5 TABLET ORAL at 12:46

## 2022-09-09 RX ADMIN — SENNOSIDES AND DOCUSATE SODIUM 1 TABLET: 50; 8.6 TABLET ORAL at 08:29

## 2022-09-09 RX ADMIN — SENNOSIDES AND DOCUSATE SODIUM 1 TABLET: 50; 8.6 TABLET ORAL at 20:21

## 2022-09-09 RX ADMIN — TAZOBACTAM SODIUM AND PIPERACILLIN SODIUM 4.5 G: 500; 4 INJECTION, SOLUTION INTRAVENOUS at 03:56

## 2022-09-09 RX ADMIN — VANCOMYCIN HYDROCHLORIDE 1000 MG: 1 INJECTION, SOLUTION INTRAVENOUS at 11:11

## 2022-09-09 RX ADMIN — ACETAMINOPHEN 975 MG: 325 TABLET, FILM COATED ORAL at 20:21

## 2022-09-09 RX ADMIN — TAZOBACTAM SODIUM AND PIPERACILLIN SODIUM 4.5 G: 500; 4 INJECTION, SOLUTION INTRAVENOUS at 20:19

## 2022-09-09 RX ADMIN — ACETAMINOPHEN 975 MG: 325 TABLET, FILM COATED ORAL at 03:56

## 2022-09-09 RX ADMIN — OXYCODONE HYDROCHLORIDE 10 MG: 5 TABLET ORAL at 17:11

## 2022-09-09 RX ADMIN — VANCOMYCIN HYDROCHLORIDE 1000 MG: 1 INJECTION, SOLUTION INTRAVENOUS at 22:18

## 2022-09-09 ASSESSMENT — ACTIVITIES OF DAILY LIVING (ADL)
ADLS_ACUITY_SCORE: 18

## 2022-09-09 NOTE — PROGRESS NOTES
"[facility]    Progress note    47 year old male POD#1 s/p Incision and drainage with bursectomy of olecranon bursitis by Dr. Sarkar  S: Patient seen at bedside in no apparent distress, alert and pleasant.  Joking this morning.  I discussed surgery and cultures which are pending, drain, dressing, the fact that he might stay all weekend depending on when cultures and sensitivities come back with the patient.  He denies numbness, tingling or major pain issues.  Patient states the pain is better since surgery.  Patient denies any fevers chills night sweats nausea or vomiting.    O: CMS intact right UE, 5/5  and thumb and interosseous strength       Dressing on, clean and dry.       Drain in and intact.  0 mL for output       Right elbow with minimal swelling and no erythema or ecchymosis that I can see.  Patient has Ace wrap from elbow to wrist.       Elbow range of motion 5 degrees short of full extension and flexion to 145 passively without catching locking or pain.    Vital signs:  Temp: 98.2  F (36.8  C) Temp src: Oral BP: 125/71 Pulse: 76   Resp: 18 SpO2: 98 % O2 Device: None (Room air) Oxygen Delivery: 6 LPM Height: 177.8 cm (5' 10\") Weight: 84.1 kg (185 lb 4.8 oz)  Estimated body mass index is 26.59 kg/m  as calculated from the following:    Height as of this encounter: 1.778 m (5' 10\").    Weight as of this encounter: 84.1 kg (185 lb 4.8 oz).      Hemoglobin   Date Value Ref Range Status   09/08/2022 12.2 (L) 13.3 - 17.7 g/dL Final     No results found for: INR      A/P:  1. Pain-controlled with Tylenol and oxycodone 5 to 10 mg  2. DVT Prophylaxis-leg pumps  3. Weight Bearing Status-NWB right UE  4. Cultures-Gram stain with gram-positive cocci, anaerobic and aerobic cultures pending still  5.  Infectious disease-tried to put in consult this morning but it is not in epic now.  We will have to discuss with hospitalist team and floor later today.  Will want there recommendations when " cultures and sensitivities come back.  6. Incision-no signs or symptoms of infection, fever yesterday but afebrile today.  Drain pulled this morning by myself.  Dressing changed to start on discharge day and then daily after that until follow-up  7.  Antibiotics-Zosyn 4.5 every 6 hours and vancomycin 1000 every 12 hours currently.    8.  Hospitalist-Servando Mccray NP following, Thank you.  Will discuss patient via messaging today.  9. Plan-patient will stay in the hospital until cultures and sensitivities come back and appropriate antibiotics can be ordered.  All orthopedic discharge orders in, everything set up for discharge for the weekend except for the antibiotics order.  Discussed plan with charge RN and patient's RN this morning.  Dr. Yin and George Renee are on for this weekend and I will hand this patient off to them for the weekend.    Richie Lozano PA-C  9/9/2022

## 2022-09-09 NOTE — DISCHARGE INSTRUCTIONS
[facility] Orthopedic Discharge Instructions     Discharge disposition:  Discharged to home   Wound Care/Dressings: Daily dressing changes with gauze and Ace wrap until follow-up   Diet: Regular  regular   Pain Control: Pain medication given, as pain gets better wean to tylenol as needed.  Oxycodone sent to pharmacy   Activity: activity as tolerated but nonweightbearing right upper extremity.  Okay for gentle finger wrist and elbow range of motion 3 times a day.   Icing: Ice on 15 minutes then off 15 minutes as needed for pain and swelling    Follow-up: Follow-up with Dr. Sarkar in 10 to 14 days for suture removal   When to Call the Office: Temperature greater than 101.5 degrees Fahrenheit.  Increasing pain not relieved by medicine or icing.  Excessive drainage from the incision that includes bright red blood.  Drainage from the incision that appears yellow, pus-like, or foul smelling.  Persistent nausea or vomiting.    Call 911 if you experience any chest pain and/or shortness or breath.   Additional instructions: Elevate above heart level and ice as needed.          Specialty scheduling should be calling you to make a follow up appt with Dr. Sarkar in 10-14 days or you can call [phone] to schedule that appt during regular business hours

## 2022-09-09 NOTE — ANESTHESIA POSTPROCEDURE EVALUATION
Patient: Yandel Norris    Procedure: Procedure(s):  BURSECTOMY, OLECRANON, Right       Anesthesia Type:  General    Note:  Disposition: Outpatient   Postop Pain Control: Uneventful            Sign Out: Well controlled pain   PONV: No   Neuro/Psych: Uneventful            Sign Out: Acceptable/Baseline neuro status   Airway/Respiratory: Uneventful            Sign Out: Acceptable/Baseline resp. status   CV/Hemodynamics: Uneventful            Sign Out: Acceptable CV status   Other NRE: NONE   DID A NON-ROUTINE EVENT OCCUR? No    Event details/Postop Comments:  Pt was happy with anesthesia care.  No complications.  I will follow up with the pt if needed.           Last vitals:  Vitals Value Taken Time   /76 09/08/22 1901   Temp 99  F (37.2  C) 09/08/22 1901   Pulse 86 09/08/22 1901   Resp 18 09/08/22 1901   SpO2 97 % 09/08/22 1901       Electronically Signed By: TORSTEN Mcfarland CRNA  September 9, 2022  2:27 PM

## 2022-09-09 NOTE — PROGRESS NOTES
[facility]    Medicine Progress Note - Hospitalist Service    Date of Admission:  9/7/2022    Assessment & Plan          Yandel Norris is a 48 year old male who presents with severe right elbow pain.  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  Over the weekend he was able to play golf and socialize with family but noticed that on Tuesday 9/6 his right elbow began to have edema and increased pain.  On 9/7 he presented to the emergency room with decreased movement and uncontrolled pain within his right elbow.      Active Problems:    Cellulitis of right upper extremity    Septic olecranon bursitis of right elbow    Zosyn and vancomycin initiated in the emergency room    Continuing Zosyn    Pharmacy to dose vancomycin    Orthopedics performed I&D on the evening of 9/8    Cultures positive for +2 gram-positive cocci.  Pending sensitivities prior to discharge    Discontinuing IV analgesics replacing with 1-2 Percocet every 6 hours for pain    Regular diet    Normal saline 125 an hour    WBC 15.4--> 13.3--> 9.4, lactate 1.6 on arrival    --> 77.2, repeat in a.m.      Hypokalemia    Replace per protocol      Alcoholism (H)    Drinks approximately 36 beers per week    Denies any withdrawal symptoms or history of         Diet: Advance Diet as Tolerated: Regular Diet Adult  Discharge Instruction - Regular Diet Adult    DVT Prophylaxis: Pneumatic Compression Devices  Feng Catheter: Not present  Central Lines: None  Cardiac Monitoring: None  Code Status: Full Code      Disposition Plan     Expected Discharge Date: 09/10/2022              The patient's care was discussed with the Attending Physician, Dr. Olmstead, Bedside Nurse and Care Coordinator/.    Servando Mccray NP  Hospitalist Service  [facility]  Securely message with the Vocera Web Console (learn more here)  Text page via [employer] Paging/Directory  "        Clinically Significant Risk Factors Present on Admission                # Overweight: Estimated body mass index is 26.59 kg/m  as calculated from the following:    Height as of this encounter: 1.778 m (5' 10\").    Weight as of this encounter: 84.1 kg (185 lb 4.8 oz).        ______________________________________________________________________    Interval History   Patient states that he is having significant reduction in pain within his right elbow.  Orthopedics was by in the morning and discontinued during.  Patient understands that he will be hospitalized until sensitivities return for cultures.    Data reviewed today: I reviewed all medications, new labs and imaging results over the last 24 hours. I personally reviewed no images or EKG's today.    Physical Exam   Vital Signs: Temp: 98.2  F (36.8  C) Temp src: Oral BP: 125/71 Pulse: 76   Resp: 18 SpO2: 98 % O2 Device: None (Room air) Oxygen Delivery: 6 LPM  Weight: 185 lbs 4.8 oz  Constitutional: awake, alert, cooperative, no apparent distress, and appears stated age  ENT: normocepalic, without obvious abnormality, atramatic  Respiratory: No increased work of breathing, good air exchange, clear to auscultation bilaterally, no crackles or wheezing  Cardiovascular: normal apical pulses  and normal S1 and S2  GI: normal bowel sounds, non-distended and non-tender  Skin: normal skin color, texture, turgor and RUE is edematous with erythema around the elbow.  Minimal drainage from right elbow  Musculoskeletal: no lower extremity pitting edema present  there is no redness, warmth, or swelling of the joints  with exception of  RIGHT ELBOW: Surgical dressing in place  swelling has reduced  tenderness present  range of motion is improving  Neurologic: Awake, alert, oriented to name, place and time.  Cranial nerves II-XII are grossly intact.  Motor is 5 out of 5 bilaterally.      Data   Recent Labs   Lab 09/09/22  0931 09/08/22  0550 09/07/22  2123   WBC 9.4 13.3* " 15.4*   HGB 11.6* 12.2* 13.1*   MCV 95 94 92    244 283    138 137   POTASSIUM 4.2 3.7  3.7 3.2*   CHLORIDE 108 107 104   CO2 25 26 26   BUN 16 17 18   CR 0.97 0.93 1.07   ANIONGAP 5 5 7   OLMAN 8.1* 7.7* 8.3*   * 123* 123*   ALBUMIN  --   --  3.4   PROTTOTAL  --   --  6.9   BILITOTAL  --   --  1.2   ALKPHOS  --   --  62   ALT  --   --  24   AST  --   --  25     No results found for this or any previous visit (from the past 24 hour(s)).  Medications     lactated ringers Stopped (09/09/22 0451)     sodium chloride 125 mL/hr at 09/08/22 0815       acetaminophen  975 mg Oral Q8H     aspirin  81 mg Oral BID     piperacillin-tazobactam  4.5 g Intravenous Q6H     polyethylene glycol  17 g Oral Daily     senna-docusate  1 tablet Oral BID     sodium chloride (PF)  3 mL Intracatheter Q8H     sodium chloride (PF)  3 mL Intracatheter Q8H     vancomycin  1,000 mg Intravenous Q12H

## 2022-09-09 NOTE — PROGRESS NOTES
"Antimicrobial Stewardship Team Note    Antimicrobial Stewardship Program - A joint venture between [facility] and  Physicians to optimize antibiotic management.  NOT a formal consult - Restricted Antimicrobial Review     Patient: Yandel Norris  MRN: [id number]  Allergies: No known allergies    Brief Summary: Yandel Norris is a 47 year old male with no significant past medical history who was admitted 9/7 for severe right elbow pain.     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 He also reported that he went golfing twice in the days after the incident and endorsed that his elbow swelled up significantly, becoming quite painful. On presentation, he was afebrile, initially hypotensive in the 70s (now normotensive, responded to fluids) and on room air. ED nursing note stated patient appeared \"pale and diaphoretic.\" Labs demonstrated leukocytosis (WBC 15.4, ANC 12.3) and an elevated CRP at 111. On exam, right elbow appeared swollen with a purulent open wound present over the olecranon. A right elbow x-ray was performed that demonstrated diffuse soft tissue edema, degenerative change of the elbow, and olecranon spurring. Subsequently, an I&D with a bursectomy of the right olecranon was performed. The operative note findings were significant for 3 abrasions with skin defect over the olecranon, with one draining purulence and the other two were into the subcutaneous tissue but were not draining. Removal of the skin flap demonstrated significant olecranon bursitis with infected and friable tissue that was debrided and further tissue was removed. Peripheral blood cultures show no growth to date and right elbow intraoperative cultures are still in process (Gram stain with 2+ GPCs). Patient was empirically started on vancomycin and piperacillin/tazobactam.          Active Anti-infective Medications   (From admission, onward)           "       Start     Stop    09/08/22 1100  vancomycin in dextrose  1,000 mg,   Intravenous,   200 mL/hr,   EVERY 12 HOURS        Sepsis        --    09/08/22 0300  piperacillin-tazobactam  4.5 g,   Intravenous,   200 mL/hr,   EVERY 6 HOURS        Skin and Soft Tissue Infection        --                  Assessment: Right septic olecranon bursitis s/p I&D with bursectomy on 9/8    Today, patient appears more stable. He continues to be afebrile, normotensive, with WBC trending down at 13.3. Patient stated that the pain in his elbow is much better since surgery per an orthopedic note. The septic bursitis seems to be due to the repetitive trauma related to the patient's occupation. Staphylococcus aureus is the most common organism in acute septic bursitis and Streptococcus species being the next.  Gram stain indicates gram positive cocci, although currently unknown whether organism presents in clusters or pairs/chains to help deduce probable pathogen.     Patient does not have a history of multi-drug resistant organisms or Pseudomonas aeruginosa. Given patient's very limited encounters with healthcare and no antibiotic history, he does not have any additional risk factors for Pseudomonas aeruginosa. Recommend discontinuing piperacillin/tazobactam and narrowing to cefazolin for MSSA and Strep coverage. Patient is a community-dweller, so concern for resistant Strep species is low. MRSA concern is also low given the patient does not have a history of isolation or risk factors (known IVDU, recent antibiotic use, prior hospitalization, etc.). However, given the purulent nature of the wound, MRSA coverage is warranted until culture data returns. Recommend continuing vancomycin until definitive culture data returns, may be able to stop vancomycin over the weekend. Additional culture data and clinical progress will help better define antibiotic plan.      Recommendations:  Stop piperacillin/tazobactam; start cefazolin 2 g IV q8h    Continue vancomycin; re-evaluate once culture data returns (may be able to stop over the weekend).     Pharmacy took the following actions: Electronic note created, Sticky note reminder created.    Discussed with ID Staff Maged Pavon MD and Leanna Mauricio, PharmD, BCIDP    Yesenia Li, 2023 PharmD Candidate  Phone number:  [phone]      Vital Signs/Clinical Features:  Vitals  Report        09/07 0700 09/08 0659 09/08 0700 09/09 0659 09/09 0700 09/09 1240   Most Recent      Temp ( F) 97.8 -  100.6    97.9 -  100.3       98.2 (36.8) 09/09 0518    Pulse 62 -  90    75 -  102       76 09/09 0518    Resp 16 -  19    16 -  24       18 09/09 0518    BP 76/47 -  153/90    116/58 -  154/86       125/71 09/09 0518    SpO2 (%) 95 -  100    93 -  99       98 09/09 0518            Labs  Estimated Creatinine Clearance: 109.6 mL/min (based on SCr of 0.97 mg/dL).  Recent Labs   Lab Test 09/07/22 2123 09/08/22  0550 09/09/22  0931   CR 1.07 0.93 0.97       Recent Labs   Lab Test 09/07/22 2123 09/08/22  0550 09/09/22  0931   WBC 15.4* 13.3* 9.4   HGB 13.1* 12.2* 11.6*   HCT 39.5* 36.8* 34.9*   MCV 92 94 95    244 254       Recent Labs   Lab Test 09/07/22 2123   BILITOTAL 1.2   ALKPHOS 62   ALBUMIN 3.4   AST 25   ALT 24       Recent Labs   Lab Test 09/07/22 2123 09/08/22  0550 09/09/22  0931   LACT 1.6  --   --    CRP  --  111.0* 77.2*             Culture Results:  7-Day Micro Results       Procedure Component Value Units Date/Time    Anaerobic Bacterial Culture Routine [[id number]] Collected: 09/08/22 1728    Order Status: Sent Lab Status: In process Updated: 09/08/22 1849    Specimen: Wound from Elbow, Right     Gram Stain [[id number]]  (Abnormal) Collected: 09/08/22 1728    Order Status: Completed Lab Status: Final result Updated: 09/09/22 0145    Specimen: Wound from Elbow, Right      Gram Stain Result 2+ Gram positive cocci      4+ WBC seen     Comment: Predominantly PMNs       Wound Aerobic  Bacterial Culture Routine [[id number]] Collected: 09/08/22 1728    Order Status: Sent Lab Status: In process Updated: 09/08/22 1849    Specimen: Wound from Elbow, Right     Blood Culture Peripheral Blood [[id number]]  (Normal) Collected: 09/07/22 2139    Order Status: Completed Lab Status: Preliminary result Updated: 09/09/22 0516    Specimen: Peripheral Blood      Culture No growth after 1 day    Narrative:      Only an Aerobic Blood Culture Bottle was collected, interpret results with caution.    Blood Culture Peripheral Blood [[id number]]  (Normal) Collected: 09/07/22 2123    Order Status: Completed Lab Status: Preliminary result Updated: 09/09/22 0516    Specimen: Peripheral Blood      Culture No growth after 1 day                                    Imaging: Elbow XR, 2 views, right    Result Date: 9/7/2022  EXAM: XR ELBOW RIGHT 2 VIEWS LOCATION: [facility] DATE/TIME: 9/7/2022 10:10 PM INDICATION: Injury. Right elbow pain. COMPARISON: None.     IMPRESSION: Diffuse soft tissue edema. Degenerative change of the elbow. Olecranon spurring. Cortical deformity of the distal humerus on the AP view laterally presumably remote fracture. If there is persistent concern for acute injury then short-term follow-up suggested.

## 2022-09-09 NOTE — PLAN OF CARE
Goal Outcome Evaluation:          Overall Patient Progress: improving    Outcome Evaluation: Pt is A&Ox4. VSS. CMS is intact. Surgical dressing is CDI. Pt is taking PO oxycodone 10 mg for pain control and has had two doses so far this shift. Pt reports that he is feeling better today. Pt tolerating regular diet eating most of his meals and denies nausea. Pt has active bowel sounds and reported having a loose bowel movement this shift-not observed by writer. Pt is IND in his room and is steady. Pt continues of IV zosyn and IV vancomycin for antibiotic treatment. Awaiting culture results.

## 2022-09-09 NOTE — OP NOTE
Procedure Date: 09/08/2022    PREOPERATIVE DIAGNOSIS:  Right septic olecranon bursitis.    POSTOPERATIVE DIAGNOSIS:  Right septic olecranon bursitis.    PROCEDURE:  Incision and drainage with bursectomy of olecranon bursitis.    HISTORY:  This is a 47-year-old male who sustained injury to his elbow about 6 days ago when he was working on concrete and scraped it several times.  He then did golfing for a couple days and noted swelling and pain at his elbow.  It gradually started seeping as he had abrasions on the elbow and then became swollen and painful.  He was seen in the Emergency Room yesterday with purulence coming from draining sinus at the olecranon bursa.  He was admitted for treatment.    DESCRIPTION OF PROCEDURE:  After a smooth general anesthetic, patient's right arm was prepped and draped in sterile fashion.  We obtained cultures from purulence coming from the wound and sent this for aerobic and anaerobic cultures.  Pause was performed for patient verification.  The arm was then exsanguinated and tourniquet inflated to 250 mmHg.  There were 3 significant abrasions with skin defect over the olecranon; one was draining purulence, the other two were into the subcutaneous tissue but were not draining.  I fashioned excisional skin incision to excise the draining sinus and one of the open areas, but left the other undisturbed as it was too wide to excise.  With removal of the skin flap we entered significant olecranon bursitis with infected tissue and friable tissue.  This was first sharply debrided with a #15 blade.  I then further removed tissue with a rongeur and curette, scraping the surfaces.  I did perform a formal bursectomy with the sharp excision.  Once this was completed, the wound was irrigated with a liter of antibiotic saline.  I then placed a Hemovac drain, exiting distally, and placed interrupted 2-0 Vicryl sutures in the subcutaneous tissue.  Skin edges were then closed with interrupted 3-0  nylon sutures.  Sterile dressings were applied and the patient was taken to the recovery room in stable condition with a soft wrap on the elbow and tourniquet deflated.    We will plan removal of the Hemovac tomorrow and removal of dressings with a change of dressing on the day of discharge.  We will determine discharge date based on his clinical course.  I will see him back in 10-14 days for suture removal.      Emir Sarkar MD        D: 2022   T: 2022   MT: [id number]    Name:     CODY WAGONER  MRN:      [id number]        Account:        [id number]   :      1975           Procedure Date: 2022     Document: [id number]

## 2022-09-09 NOTE — PLAN OF CARE
Goal Outcome Evaluation:    Plan of Care Reviewed With: patient     Overall Patient Progress: no change    Outcome Evaluation: VSS, right arm wrapped with ace bandage after surgery CNS intact. Bacon drain in place with 0 output this shift. Patient is saline locked and is receiving intermittent IV antibiotics. IV dilaudid given x2 for pain. Capnography in place. Patient is tolerating regular diet and voiding in urinal. Patient refused morning blood draw and was verbally aggressive with lab staff and nursing support. Bacon drain was removed by orthopedic PA.        36.7

## 2022-09-10 VITALS
OXYGEN SATURATION: 96 % | TEMPERATURE: 99.3 F | BODY MASS INDEX: 26.53 KG/M2 | SYSTOLIC BLOOD PRESSURE: 139 MMHG | RESPIRATION RATE: 18 BRPM | WEIGHT: 185.3 LBS | HEIGHT: 70 IN | HEART RATE: 83 BPM | DIASTOLIC BLOOD PRESSURE: 72 MMHG

## 2022-09-10 LAB
ANION GAP SERPL CALCULATED.3IONS-SCNC: 4 MMOL/L (ref 3–14)
BUN SERPL-MCNC: 11 MG/DL (ref 7–30)
CALCIUM SERPL-MCNC: 8.1 MG/DL (ref 8.5–10.1)
CHLORIDE BLD-SCNC: 108 MMOL/L (ref 94–109)
CO2 SERPL-SCNC: 26 MMOL/L (ref 20–32)
CREAT SERPL-MCNC: 0.96 MG/DL (ref 0.66–1.25)
CRP SERPL-MCNC: 44.5 MG/L (ref 0–8)
ERYTHROCYTE [DISTWIDTH] IN BLOOD BY AUTOMATED COUNT: 13.1 % (ref 10–15)
GFR SERPL CREATININE-BSD FRML MDRD: >90 ML/MIN/1.73M2
GLUCOSE BLD-MCNC: 114 MG/DL (ref 70–99)
HCT VFR BLD AUTO: 34.2 % (ref 40–53)
HGB BLD-MCNC: 11.1 G/DL (ref 13.3–17.7)
HOLD SPECIMEN: NORMAL
MCH RBC QN AUTO: 30.8 PG (ref 26.5–33)
MCHC RBC AUTO-ENTMCNC: 32.5 G/DL (ref 31.5–36.5)
MCV RBC AUTO: 95 FL (ref 78–100)
PLATELET # BLD AUTO: 278 10E3/UL (ref 150–450)
POTASSIUM BLD-SCNC: 4.3 MMOL/L (ref 3.4–5.3)
RBC # BLD AUTO: 3.6 10E6/UL (ref 4.4–5.9)
SODIUM SERPL-SCNC: 138 MMOL/L (ref 133–144)
VANCOMYCIN SERPL-MCNC: 11.3 MG/L
WBC # BLD AUTO: 9.7 10E3/UL (ref 4–11)

## 2022-09-10 PROCEDURE — 250N000011 HC RX IP 250 OP 636: Performed by: INTERNAL MEDICINE

## 2022-09-10 PROCEDURE — 36415 COLL VENOUS BLD VENIPUNCTURE: CPT | Performed by: NURSE PRACTITIONER

## 2022-09-10 PROCEDURE — 258N000003 HC RX IP 258 OP 636: Performed by: NURSE PRACTITIONER

## 2022-09-10 PROCEDURE — 250N000013 HC RX MED GY IP 250 OP 250 PS 637: Performed by: INTERNAL MEDICINE

## 2022-09-10 PROCEDURE — 250N000011 HC RX IP 250 OP 636: Performed by: PEDIATRICS

## 2022-09-10 PROCEDURE — 250N000013 HC RX MED GY IP 250 OP 250 PS 637: Performed by: NURSE PRACTITIONER

## 2022-09-10 PROCEDURE — 80202 ASSAY OF VANCOMYCIN: CPT | Performed by: NURSE PRACTITIONER

## 2022-09-10 PROCEDURE — 250N000013 HC RX MED GY IP 250 OP 250 PS 637: Performed by: ORTHOPAEDIC SURGERY

## 2022-09-10 PROCEDURE — 99239 HOSP IP/OBS DSCHRG MGMT >30: CPT | Performed by: NURSE PRACTITIONER

## 2022-09-10 PROCEDURE — 250N000011 HC RX IP 250 OP 636: Performed by: NURSE PRACTITIONER

## 2022-09-10 PROCEDURE — 80048 BASIC METABOLIC PNL TOTAL CA: CPT | Performed by: NURSE PRACTITIONER

## 2022-09-10 PROCEDURE — 99207 PR NO CHARGE LOS: CPT | Performed by: STUDENT IN AN ORGANIZED HEALTH CARE EDUCATION/TRAINING PROGRAM

## 2022-09-10 PROCEDURE — 85027 COMPLETE CBC AUTOMATED: CPT | Performed by: NURSE PRACTITIONER

## 2022-09-10 PROCEDURE — 86140 C-REACTIVE PROTEIN: CPT | Performed by: NURSE PRACTITIONER

## 2022-09-10 RX ORDER — SULFAMETHOXAZOLE/TRIMETHOPRIM 800-160 MG
1 TABLET ORAL 2 TIMES DAILY
Qty: 20 TABLET | Refills: 0 | Status: SHIPPED | OUTPATIENT
Start: 2022-09-10 | End: 2022-09-10

## 2022-09-10 RX ORDER — SULFAMETHOXAZOLE/TRIMETHOPRIM 800-160 MG
1 TABLET ORAL ONCE
Status: COMPLETED | OUTPATIENT
Start: 2022-09-10 | End: 2022-09-10

## 2022-09-10 RX ORDER — ACETAMINOPHEN 325 MG/1
325-650 TABLET ORAL EVERY 4 HOURS PRN
Status: DISCONTINUED | OUTPATIENT
Start: 2022-09-10 | End: 2022-09-10 | Stop reason: HOSPADM

## 2022-09-10 RX ORDER — CEFAZOLIN SODIUM 1 G/50ML
1250 SOLUTION INTRAVENOUS EVERY 12 HOURS
Status: DISCONTINUED | OUTPATIENT
Start: 2022-09-10 | End: 2022-09-10 | Stop reason: HOSPADM

## 2022-09-10 RX ORDER — CEPHALEXIN 500 MG/1
500 CAPSULE ORAL 2 TIMES DAILY
Qty: 20 CAPSULE | Refills: 0 | Status: SHIPPED | OUTPATIENT
Start: 2022-09-10 | End: 2022-09-20

## 2022-09-10 RX ORDER — OXYCODONE AND ACETAMINOPHEN 5; 325 MG/1; MG/1
1-2 TABLET ORAL EVERY 4 HOURS PRN
Status: DISCONTINUED | OUTPATIENT
Start: 2022-09-10 | End: 2022-09-10 | Stop reason: HOSPADM

## 2022-09-10 RX ORDER — SULFAMETHOXAZOLE/TRIMETHOPRIM 800-160 MG
1 TABLET ORAL 2 TIMES DAILY
Qty: 20 TABLET | Refills: 0 | Status: SHIPPED | OUTPATIENT
Start: 2022-09-10

## 2022-09-10 RX ORDER — CEFAZOLIN SODIUM/WATER 2 G/20 ML
2 SYRINGE (ML) INTRAVENOUS EVERY 6 HOURS
Status: DISCONTINUED | OUTPATIENT
Start: 2022-09-10 | End: 2022-09-10 | Stop reason: HOSPADM

## 2022-09-10 RX ADMIN — OXYCODONE HYDROCHLORIDE 10 MG: 5 TABLET ORAL at 08:06

## 2022-09-10 RX ADMIN — Medication 2 G: at 14:56

## 2022-09-10 RX ADMIN — Medication 1 MG: at 01:44

## 2022-09-10 RX ADMIN — Medication 2 G: at 09:47

## 2022-09-10 RX ADMIN — OXYCODONE HYDROCHLORIDE 10 MG: 5 TABLET ORAL at 01:44

## 2022-09-10 RX ADMIN — Medication: at 04:01

## 2022-09-10 RX ADMIN — SULFAMETHOXAZOLE AND TRIMETHOPRIM 1 TABLET: 800; 160 TABLET ORAL at 17:33

## 2022-09-10 RX ADMIN — OXYCODONE HYDROCHLORIDE 10 MG: 5 TABLET ORAL at 12:23

## 2022-09-10 RX ADMIN — ACETAMINOPHEN 975 MG: 325 TABLET, FILM COATED ORAL at 11:14

## 2022-09-10 RX ADMIN — Medication: at 09:47

## 2022-09-10 RX ADMIN — ASPIRIN 81 MG: 81 TABLET, COATED ORAL at 08:06

## 2022-09-10 RX ADMIN — VANCOMYCIN HYDROCHLORIDE 1250 MG: 1 INJECTION, POWDER, LYOPHILIZED, FOR SOLUTION INTRAVENOUS at 11:14

## 2022-09-10 RX ADMIN — OXYCODONE HYDROCHLORIDE AND ACETAMINOPHEN 2 TABLET: 5; 325 TABLET ORAL at 16:02

## 2022-09-10 RX ADMIN — ACETAMINOPHEN 975 MG: 325 TABLET, FILM COATED ORAL at 03:24

## 2022-09-10 RX ADMIN — TAZOBACTAM SODIUM AND PIPERACILLIN SODIUM 4.5 G: 500; 4 INJECTION, SOLUTION INTRAVENOUS at 03:24

## 2022-09-10 ASSESSMENT — ACTIVITIES OF DAILY LIVING (ADL)
ADLS_ACUITY_SCORE: 18

## 2022-09-10 NOTE — PROGRESS NOTES
Infectious Disease Curbside Note  I was called by Servando Mccray NP on 9/10/2022 at 9:39 AM to provide input for Yandel Norris MRN [id number]. The patient is located at [facility]. The nature of this request for a curbside consultation does not permit me to perform a comprehensive review of health care records, patient/family interview, nor an examination of the patient. I obtained limited patient information from the provider on the phone call and a limited chart review.    Based on only the information I was provided today, I make the following recommendations to the treating provider/team for their review and consideration:   48 y/o male with right Staph aureus septic olecranon bursitis s/p I&D with bursectomy 9/8/22. Cultures with Staph aureus, susceptibilities pending. Initially on Vanc/Zosyn, now on Vanc/Ancef. Leukocytosis resolved, CRP downtrending    Recs:  - At discharge, stop IV antibiotics  - If Staph aureus is MSSA - discharge on PO Cefadroxil 1gm q12h or PO Keflex 500mg q6h to complete a 14 day course  - If Staph aureus is MRSA, discharge on PO Bactrim 1DS q12h or PO Doxycycline 100mg q12h to complete a 14 day course    These recommendations are not intended to take the place of the care team's clinical judgement, which should always be utilized to provide the most appropriate care to meet the unique needs of each patient. The recommendations offered were based on the limited scope of information provided as today's date. Should additional guidance be needed or required a formal consultation with infectious diseases is recommended.    *If a patient is discharged on IV antibiotics it is not the responsibility of the ID curbside provider to monitor labs or sign for antibiotic orders unless it is otherwise stated. If further outpatient management is required then an outpatient ID consult should be placed.

## 2022-09-10 NOTE — DISCHARGE SUMMARY
[facility]  Hospitalist Discharge Summary      Date of Admission:  9/7/2022  Date of Discharge:  9/10/2022  Discharging Provider: Servando Mccray NP  Discharge Service: Hospitalist Service    Discharge Diagnoses   Septic olecranon bursitis of right elbow requiring washout    Follow-ups Needed After Discharge   Follow-up Appointments     Follow Up Care      Follow-up with Dr. Sarkar in 10 to 14 days for suture removal.  No   x-rays will be needed at that time.    Follow up with your Primary Care Provider in 7-10 days for hospital   admission.             Unresulted Labs Ordered in the Past 30 Days of this Admission     Date and Time Order Name Status Description    9/8/2022  5:29 PM Synovial fluid Aerobic Bacterial Culture Routine Preliminary     9/8/2022  5:29 PM Anaerobic Bacterial Culture Routine Preliminary     9/7/2022  9:06 PM Blood Culture Peripheral Blood Preliminary     9/7/2022  9:06 PM Blood Culture Peripheral Blood Preliminary       These results will be followed up by hospitalist and PCP    Discharge Disposition   Discharged to home  Condition at discharge: Stable    Hospital Course        Yandel Norris is a 48 year old male who presents with severe right elbow pain.  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  Over the weekend he was able to play golf and socialize with family but noticed that on Tuesday 9/6 his right elbow began to have edema and increased pain.  On 9/7 he presented to the emergency room with decreased movement and uncontrolled pain within his right elbow.  Patient was taken back by orthopedics for I&D of the right elbow.  While inpatient patient would like to discharge and discussion with pharmacy/infectious disease determined that patient would be adequately covered by Bactrim DS twice daily until cultures return and narrowing as possible.  Hospital course laid out below:      Active Problems:    Cellulitis of right  upper extremity    Septic olecranon bursitis of right elbow    Zosyn and vancomycin initiated in the emergency room    Discontinued Zosyn, changed to Ancef on 9/10    Pharmacy to dose vancomycin    Orthopedics performed I&D on the evening of 9/8    Cultures positive for 4+ Staphylococcus aureus.  Sensitivities are still pending prior to discharge    Once sensitivities result infectious disease recommends cefadroxil 1 g twice daily or cephalexin 500 mg 4 times daily for MSSA.  Bactrim DS twice daily for MRSA    Discontinuing IV analgesics replacing with 1-2 Percocet every 4 hours for pain    Regular diet    saline lock with adequate oral intake    WBC 15.4--> 13.3--> 9.4--> 9.7, lactate 1.6 on arrival    --> 77.2--> 44.5      Hypokalemia    Replace per protocol      Alcoholism (H)    Drinks approximately 36 beers per week    Denies any withdrawal symptoms or history of    Consultations This Hospital Stay   PHARMACY TO DOSE VANCO  ORTHOPEDIC SURGERY IP CONSULT    Code Status   Full Code    Time Spent on this Encounter   I, Servando Mccray NP, personally saw the patient today and spent greater than 30 minutes discharging this patient.       Servando Mccray NP  [address] MEDICAL SURGICAL  [address] [place] [address]  Phone: [phone]  ______________________________________________________________________    Physical Exam   Vital Signs: Temp: 99.3  F (37.4  C) Temp src: Oral BP: 139/72 Pulse: 83   Resp: 18 SpO2: 96 % O2 Device: None (Room air)    Weight: 185 lbs 4.8 oz  Constitutional: awake, alert, cooperative, no apparent distress, and appears stated age  ENT: normocepalic, without obvious abnormality, atramatic  Respiratory: No increased work of breathing, good air exchange, clear to auscultation bilaterally, no crackles or wheezing  Cardiovascular: normal apical pulses  and normal S1 and S2  GI: normal bowel sounds, non-distended and non-tender  Skin: no bruising or  "bleeding, normal skin color, texture, turgor and surgical sutures are clean dry and intact.  Surgical dressing changed and minimal drainage noted  Musculoskeletal: no lower extremity pitting edema present  there is no redness, warmth, or swelling of the joints  RIGHT ELBOW:  range of motion limited by orthopedics recommendations not to exceed 90 degree bend until stitches are removed  Neurologic: Awake, alert, oriented to name, place and time.  Cranial nerves II-XII are grossly intact.  Motor is 5 out of 5 bilaterally.         Primary Care Physician   Physician No Ref-Primary    Discharge Orders      Reason for your hospital stay    Septic right elbow olecranon bursitis status post irrigation debridement right elbow olecranon bursitis by Dr. Sarkar     When to call - Contact Surgeon Team    You may experience symptoms that require follow-up before your scheduled appointment. Refer to the \"[employer]\" for instructions on when to contact your Surgeon Team if you are concerned about pain control, blood clots, constipation, or if you are unable to urinate.     When to call - Reach out to Urgent Care    If you are not able to reach your Surgeon Team and you need immediate care, go to the Orthopedic Walk-in Clinic or Urgent Care at your Surgeon's office.  Do NOT go to the Emergency Room unless you have shortness of breath, chest pain, or other signs of a medical emergency.     When to call - Reasons to Call 911    Call 911 immediately if you experience sudden-onset chest pain, arm weakness/numbness, slurred speech, or shortness of breath     Discharge Instruction - Breathing exercises    Perform breathing exercises using your Incentive Spirometer 10 times per hour while awake for 2 weeks.     Symptoms - Fever Management    A low grade fever can be expected after surgery.  Use acetaminophen (TYLENOL) as needed for fever management.  Contact your Surgeon Team if you have a fever greater than 101.5 F, chills, and/or " night sweats.     Symptoms - Constipation management    Constipation (hard, dry bowel movements) is expected after surgery due to the combination of being less active, the anesthetic, and the opioid pain medication.  You can do the following to help reduce constipation:  ~  FLUIDS:  Drink clear liquids (water or Gatorade), or juice (apple/prune).  ~  DIET:  Eat a fiber rich diet.    ~  ACTIVITY:  Get up and move around several times a day.  Increase your activity as you are able.  MEDICATIONS:  Reduce the risk of constipation by starting medications before you are constipated.  You can take Miralax   (1 packet as directed) and/or a stool softener (Senokot 1-2 tablets 1-2 times a day).  If you already have constipation and these medications are not working, you can get magnesium citrate and use as directed.  If you continue to have constipation you can try an over the counter suppository or enema.  Call your Surgeon Team if it has been greater than 3 days since your last bowel movement.     Symptoms - Reduced Urine Output    Changes in the amount of fluids you drank before and after surgery may result in problems urinating.  It is important to stay well-hydrated after surgery and drink plenty of water. If it has been greater than 8 hours since you have urinated despite drinking plenty of water, call your Surgeon Team.     Activity - Exercises to prevent blood clots    Unless otherwise directed by your Surgeon team, perform the following exercises at least three times per day for the first four weeks after surgery to prevent blood clots in your legs: 1) Point and flex your feet (Ankle Pumps), 2) Move your ankle around in big circles, 3) Wiggle your toes, 4) Walk, even for short distances, several times a day, will help decrease the risk of blood clots.     Comfort and Pain Management - Pain after Surgery    Pain after surgery is normal and expected.  You will have some amount of pain for several weeks after surgery.   Your pain will improve with time.  There are several things you can do to help reduce your pain including: rest, ice, elevation, and using pain medications as needed. Contact your Surgeon Team if you have pain that persists or worsens after surgery despite rest, ice, elevation, and taking your medication(s) as prescribed. Contact your Surgeon Team if you have new numbness, tingling, or weakness in your operative extremity.     Comfort and Pain Management - Swelling after Surgery    Swelling and/or bruising of the surgical extremity is common and may persist for several months after surgery. In addition to frequent icing and elevation, gentle compressive support with an ACE wrap or tubigrip may help with swelling. Apply compression regularly, removing at least twice daily to perform skin checks. Contact your Surgeon Team if your swelling increases and is NOT associated with an increase in your activity level, or if your swelling increases and is associated with redness and pain.     Comfort and Pain Management - Cold therapy    Ice can be used to control swelling and discomfort after surgery. Place a thin towel over your operative site and apply the ice pack overtop. Leave ice pack in place for 20 minutes, then remove for 20 minutes. Repeat this 20 minutes on/20 minutes off routine as often as tolerated.     Medication Instructions - Acetaminophen (TYLENOL) Instructions    You were discharged with acetaminophen (TYLENOL) for pain management after surgery. Acetaminophen most effectively manages pain symptoms when it is taken on a schedule without missing doses (every four, six, or eight hours). Your Provider will prescribe a safe daily dose between 3000 - 4000 mg.  Do NOT exceed this daily dose. Most patients use acetaminophen for pain control for the first four weeks after surgery.  You can wean from this medication as your pain decreases.     Medication Instructions - NSAID Instructions    You were discharged with  an anti-inflammatory medication for pain management to use in combination with acetaminophen (TYLENOL) and the narcotic pain medication.  Take this medication exactly as directed.  You should only take one anti-inflammatory at a time.  Some common anti-inflammatories include: ibuprofen (ADVIL, MOTRIN), naproxen (ALEVE, NAPROSYN), celecoxib (CELEBREX), meloxicam (MOBIC), ketorolac (TORADOL).  Take this medication with food and water.     Medication Instructions - Opioids - Tapering Instructions    In the first three days following surgery, your symptoms may warrant use of the narcotic pain medication every four to six hours as prescribed. This is normal. As your pain symptoms improve, focus your efforts on decreasing (tapering) use of narcotic medications. The most successful tapering strategy is to first, decrease the number of tablets you take every 4-6 hours to the minimum prescribed. Then, increase the amount of time between doses.  For example:  First, taper to   or 1 tablet every 4-6 hours.  Then, taper to   or 1 tablet every 6-8 hours.  Then, taper to   or 1 tablet every 8-10 hours.  Then, taper to   or 1 tablet every 10-12 hours.  Then, taper to   or 1 tablet at bedtime.  The bedtime dose can help with comfort during sleep and is typically the last dose to be discontinued after surgery.     Medication instructions - No pharmacologic VTE prophylaxis prescribed    Your Surgeon did not prescribe medication for anticoagulation.     Comfort and Pain Management - UPPER extremity Elevation    Swelling is expected for several months after surgery. This type of swelling is usually associated with gravity and activity, and can be improved with elevation.   The best way to do this is to get your hand above your heart by sitting down, resting your elbow on a pillow or arm rest, with your hand in the air. Perform this elevation as often as possible especially for the first two weeks after surgery     Medication  Instructions - Opioid Instructions (1 - 2 tablets Q 4-6 hours, MAX 6 tablets)    You were discharged with an opioid medication (hydromorphone, oxycodone, hydrocodone, or tramadol). This medication should only be taken for breakthrough pain that is not controlled with acetaminophen (TYLENOL). If you rate your pain less than 3 you do not need this medication.  Pain rating 0-3:  You do not need this medication.  Pain rating 4-6:  Take 1 tablet every 4-6 hours as needed  Pain rating 7-10:  Take 2 tablets every 4-6 hours as needed.  Do not exceed 6 tablets per day     Return to Driving    Return to driving - Driving is NOT permitted until directed by your provider. Under no circumstance are you permitted to drive while using narcotic pain medications.     Dressing / Wound Care - Wound    You have a clean dressing on your surgical wound. Dressing change instructions as follows: Daily dressing changing after discharge with gauze and Ace wrap over the elbow Until follow-up.  Contact your Surgeon Team if you have increased redness, warmth around the surgical wound, and/or drainage from the surgical wound.     Dressing / Wound Care - NO Tub Bathing    Tub bathing, swimming, or any other activities that will cause your incision to be submerged in water should be avoided until allowed by your Surgeon.     Wrist range of motion    Perform Fist pumps every hour while you are awake.     Activity - Other    Okay for gentle finger wrist and elbow range of motion 3 times a day.     Precautions    No weightbearing and no lifting with right upper extremity.  Only gentle range of motion of elbow.  Keep extremity quiet.     NO weight bearing    No weight bearing to operative extremity.     NO Brace / Immobilizer     Dressing / Wound care - Shower with wound/dressing covered    You must COVER your dressing or incision with saran wrap (or any other non-permeable covering) to allow the incision to remain dry while showering.  You may shower  0 days after surgery as long as the surgical wound stays dry. Continue to cover your dressing or incision for showering until your first office visit.  You are strictly prohibited from soaking   or submerging the surgical wound underwater.     No VTE Prophylaxis     Follow Up Care    Follow-up with Dr. Sarkar in 10 to 14 days for suture removal.  No x-rays will be needed at that time.    Follow up with your Primary Care Provider in 7-10 days for hospital admission.     Discharge Instruction - Regular Diet Adult    Return to your pre-surgery diet unless instructed otherwise       Significant Results and Procedures   Results for orders placed or performed during the hospital encounter of 09/07/22   Elbow XR, 2 views, right    Narrative    EXAM: XR ELBOW RIGHT 2 VIEWS  LOCATION: [facility]  DATE/TIME: 9/7/2022 10:10 PM    INDICATION: Injury. Right elbow pain.  COMPARISON: None.      Impression    IMPRESSION: Diffuse soft tissue edema. Degenerative change of the elbow. Olecranon spurring. Cortical deformity of the distal humerus on the AP view laterally presumably remote fracture. If there is persistent concern for acute injury then short-term   follow-up suggested.       Discharge Medications   Current Discharge Medication List      START taking these medications    Details   !! acetaminophen (TYLENOL) 325 MG tablet Take 2 tablets (650 mg) by mouth every 4 hours as needed for other (For optimal non-opioid multimodal pain management to improve pain control.)  Qty: 100 tablet, Refills: 0    Associated Diagnoses: Septic olecranon bursitis of right elbow      !! acetaminophen (TYLENOL) 325 MG tablet Take 2 tablets (650 mg) by mouth every 4 hours as needed for other (mild pain)  Qty: 100 tablet, Refills: 0    Associated Diagnoses: Septic olecranon bursitis of right elbow      oxyCODONE (ROXICODONE) 5 MG tablet Take 1-2 tablets (5-10 mg) by mouth every 4 hours as needed for moderate to  severe pain  Qty: 25 tablet, Refills: 0    Associated Diagnoses: Septic olecranon bursitis of right elbow      senna-docusate (SENOKOT-S/PERICOLACE) 8.6-50 MG tablet Take 1 tablet by mouth 2 times daily  Qty: 30 tablet, Refills: 1    Associated Diagnoses: Septic olecranon bursitis of right elbow      sulfamethoxazole-trimethoprim (BACTRIM DS) 800-160 MG tablet Take 1 tablet by mouth 2 times daily for 10 days  Qty: 20 tablet, Refills: 0    Associated Diagnoses: Cellulitis of right upper extremity; Septic olecranon bursitis of right elbow       !! - Potential duplicate medications found. Please discuss with provider.        Allergies   Allergies   Allergen Reactions     No Known Allergies

## 2022-09-10 NOTE — PROGRESS NOTES
Care Management has sent a referral to hospital financial counselor, as patient is listed as having no insurance.    Marcela Vivas [facility]   [phone]

## 2022-09-10 NOTE — PLAN OF CARE
Goal Outcome Evaluation:    Plan of Care Reviewed With: patient     Overall Patient Progress: improving    Outcome Evaluation: Patient remains A&O, VSS on room air, afebrile. Dressing to right extremity remains CDI. 10mg oxy given x1 for pain, Bengay added for back pain with improvement. IV zosyn given overnight. Up to bathroom independently.

## 2022-09-10 NOTE — PROGRESS NOTES
[facility]    Medicine Progress Note - Hospitalist Service    Date of Admission:  9/7/2022    Assessment & Plan          Yandel Norris is a 48 year old male who presents with severe right elbow pain.  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  Over the weekend he was able to play golf and socialize with family but noticed that on Tuesday 9/6 his right elbow began to have edema and increased pain.  On 9/7 he presented to the emergency room with decreased movement and uncontrolled pain within his right elbow.      Active Problems:    Cellulitis of right upper extremity    Septic olecranon bursitis of right elbow    Zosyn and vancomycin initiated in the emergency room    Discontinued Zosyn, changed to Ancef on 9/10    Pharmacy to dose vancomycin    Orthopedics performed I&D on the evening of 9/8    Cultures positive for 4+ Staphylococcus aureus.  Sensitivities are still pending prior to discharge    Once sensitivities result infectious disease recommends cefadroxil 1 g twice daily or cephalexin 500 mg 4 times daily for MSSA.  Bactrim DS twice daily for MRSA    Discontinuing IV analgesics replacing with 1-2 Percocet every 4 hours for pain    Regular diet    saline lock with adequate oral intake    WBC 15.4--> 13.3--> 9.4--> 9.7, lactate 1.6 on arrival    --> 77.2--> 44.5, repeat in a.m.      Hypokalemia    Replace per protocol      Alcoholism (H)    Drinks approximately 36 beers per week    Denies any withdrawal symptoms or history of         Diet: Advance Diet as Tolerated: Regular Diet Adult  Discharge Instruction - Regular Diet Adult    DVT Prophylaxis: Pneumatic Compression Devices  Feng Catheter: Not present  Central Lines: None  Cardiac Monitoring: None  Code Status: Full Code      Disposition Plan     Expected Discharge Date: 09/11/2022              The patient's care was discussed with the Attending Physician, Dr. Olmstead, Bedside Nurse and  "Care Coordinator/.    Servando Mccray NP  Hospitalist Service  [facility]  Securely message with the [employer] Web Console (learn more here)  Text page via [employer] Paging/Directory         Clinically Significant Risk Factors Present on Admission                # Overweight: Estimated body mass index is 26.59 kg/m  as calculated from the following:    Height as of this encounter: 1.778 m (5' 10\").    Weight as of this encounter: 84.1 kg (185 lb 4.8 oz).        ______________________________________________________________________    Interval History   Patient states that he is having significant reduction in pain within his right elbow.  Orthopedics was by in the morning and discontinued during.  Patient understands that he will be hospitalized until sensitivities return for cultures.  9/10 no acute events overnight patient states that he is having intermittent pain when mobilizing his arm.  Currently waiting sensitivities prior to discharge.  Anticipate discharge on 9/11    Data reviewed today: I reviewed all medications, new labs and imaging results over the last 24 hours. I personally reviewed no images or EKG's today.    Physical Exam   Vital Signs: Temp: 96.9  F (36.1  C) Temp src: Oral BP: (!) 157/94 Pulse: 75   Resp: 18 SpO2: 93 % O2 Device: None (Room air)    Weight: 185 lbs 4.8 oz  Constitutional: awake, alert, cooperative, no apparent distress, and appears stated age  ENT: normocepalic, without obvious abnormality, atramatic  Respiratory: No increased work of breathing, good air exchange, clear to auscultation bilaterally, no crackles or wheezing  Cardiovascular: normal apical pulses  and normal S1 and S2  GI: normal bowel sounds, non-distended and non-tender  Skin: normal skin color, texture, turgor and RUE edema and erythema have improved around the elbow.  Minimal drainage from right elbow  Musculoskeletal: no lower extremity pitting edema present  there is no " redness, warmth, or swelling of the joints  with exception of  RIGHT ELBOW: Surgical dressing in place  swelling has reduced  tenderness present  range of motion is improving  Neurologic: Awake, alert, oriented to name, place and time.  Cranial nerves II-XII are grossly intact.  Motor is 5 out of 5 bilaterally.      Data   Recent Labs   Lab 09/10/22  0610 09/09/22  0931 09/08/22  0550 09/07/22  2123   WBC 9.7 9.4 13.3* 15.4*   HGB 11.1* 11.6* 12.2* 13.1*   MCV 95 95 94 92    254 244 283    138 138 137   POTASSIUM 4.3 4.2 3.7  3.7 3.2*   CHLORIDE 108 108 107 104   CO2 26 25 26 26   BUN 11 16 17 18   CR 0.96 0.97 0.93 1.07   ANIONGAP 4 5 5 7   OLMAN 8.1* 8.1* 7.7* 8.3*   * 132* 123* 123*   ALBUMIN  --   --   --  3.4   PROTTOTAL  --   --   --  6.9   BILITOTAL  --   --   --  1.2   ALKPHOS  --   --   --  62   ALT  --   --   --  24   AST  --   --   --  25     No results found for this or any previous visit (from the past 24 hour(s)).  Medications     lactated ringers Stopped (09/09/22 0451)     sodium chloride 125 mL/hr at 09/08/22 0815       acetaminophen  975 mg Oral Q8H     aspirin  81 mg Oral BID     ceFAZolin  2 g Intravenous Q6H     polyethylene glycol  17 g Oral Daily     senna-docusate  1 tablet Oral BID     sodium chloride (PF)  3 mL Intracatheter Q8H     vancomycin  1,250 mg Intravenous Q12H

## 2022-09-10 NOTE — PLAN OF CARE
Goal Outcome Evaluation:    Plan of Care Reviewed With: patient          Outcome Evaluation: patient A&O, surgical dressing is CDI, 10mg oxy x2 PRN for pain, ambulating in halls, using IS, zosyn and vanco IV

## 2022-09-10 NOTE — PROGRESS NOTES
"ORTHO DAILY PROGRESS NOTE    POD # 3    Yandel Norris is a 47 year old male  s/p Procedure(s):  BURSECTOMY, OLECRANON, Right -  on 2022 - 2022  Surgeon: Dr. Sarkar     Interim: having a bit more pain today.  Antibiotics changed last night. Now on Ancef and Vanco    OBJECTIVE:  BP (!) 157/94 (BP Location: Left arm, Cuff Size: Adult Small)   Pulse 75   Temp 96.9  F (36.1  C) (Oral)   Resp 18   Ht 1.778 m (5' 10\")   Wt 84.1 kg (185 lb 4.8 oz)   SpO2 93%   BMI 26.59 kg/m      Temp (24hrs), Av.6  F (36.4  C), Min:96.9  F (36.1  C), Max:98.6  F (37  C)      By report:   CMSI  Wound: dressing intact. Dressing change pending      Recent Labs   Lab Test 09/10/22  0610 22  0931 22  0550   HGB 11.1* 11.6* 12.2*    254 244     Recent Labs   Lab Test 09/10/22  0610 22  0931 22  0550   POTASSIUM 4.3 4.2 3.7  3.7   BUN 11 16 17   CR 0.96 0.97 0.93         Micro: 4+ Staph Aureus.  Sensitivities pending        ASSESSMENT:  Status post olecranon bursa I&D, doing well.     PLAN:  ID following.  Awaiting culture sensitivities. discharge when oral antibiotic choice determined.   Follow up per discharge orders  Daily dressing changes. Limit range of motion of elbow.    SHARLA Yin MD  Orthopedic Surgery   [facility]  P: [phone]  "

## 2022-09-10 NOTE — PLAN OF CARE
Goal Outcome Evaluation:    Plan of Care Reviewed With: patient     Overall Patient Progress: improving         Patient alert and oriented. Vital signs stable. New dressing on R arm placed today. Independent in room.

## 2022-09-11 LAB — BACTERIA SNV CULT: ABNORMAL

## 2022-09-11 NOTE — PROGRESS NOTES
S-(situation): Patient discharged to home via ambulated with girlfrient    B-(background): surgical, right elbow    A-(assessment): vss, pain managed    R-(recommendations): Discharge instructions reviewed with patient. Listed belongings gathered and returned to patient.          Discharge Nursing Criteria:     Care Plan and Patient education resolved: Yes    New Medications- pt has been educated about purpose and side effects: Yes    Vaccines  Influenza status verified at discharge:  Not Applicable      MISC  Prescriptions if needed, hard copies sent with patient  Yes  Home medications returned to patient: NA  Medication Bin checked and emptied on discharge Yes  Patient reports post-discharge pain management plan is effective: Yes

## 2022-09-13 ENCOUNTER — PATIENT OUTREACH (OUTPATIENT)
Dept: CARE COORDINATION | Facility: CLINIC | Age: 47
End: 2022-09-13

## 2022-09-13 LAB
BACTERIA BLD CULT: NO GROWTH
BACTERIA BLD CULT: NO GROWTH

## 2022-09-13 NOTE — PROGRESS NOTES
Clinic Care Coordination Contact  [facility]: Post-Discharge Note  SITUATION                                                      Admission:    Admission Date: 09/07/22   Reason for Admission: Septic olecranon bursitis of right elbow requiring washout  Discharge:   Discharge Date: 09/10/22  Discharge Diagnosis: Septic olecranon bursitis of right elbow requiring washout    BACKGROUND                                                      Yandel Norris is a 48 year old male who presents with severe right elbow pain.  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  Over the weekend he was able to play golf and socialize with family but noticed that on Tuesday 9/6 his right elbow began to have edema and increased pain.  On 9/7 he presented to the emergency room with decreased movement and uncontrolled pain within his right elbow.  Patient was taken back by orthopedics for I&D of the right elbow.  While inpatient patient would like to discharge and discussion with pharmacy/infectious disease determined that patient would be adequately covered by Bactrim DS twice daily until cultures return and narrowing as possible.  Hospital course laid out below:        Active Problems:    Cellulitis of right upper extremity    Septic olecranon bursitis of right elbow    Zosyn and vancomycin initiated in the emergency room  ? Discontinued Zosyn, changed to Ancef on 9/10  ? Pharmacy to dose vancomycin    Orthopedics performed I&D on the evening of 9/8  ? Cultures positive for 4+ Staphylococcus aureus.  Sensitivities are still pending prior to discharge    Once sensitivities result infectious disease recommends cefadroxil 1 g twice daily or cephalexin 500 mg 4 times daily for MSSA.  Bactrim DS twice daily for MRSA    Discontinuing IV analgesics replacing with 1-2 Percocet every 4 hours for pain    Regular diet    saline lock with adequate oral intake    WBC 15.4--> 13.3--> 9.4--> 9.7, lactate 1.6 on  arrival    --> 77.2--> 44.5       Hypokalemia    Replace per protocol       Alcoholism (H)    Drinks approximately 36 beers per week    Denies any withdrawal symptoms or history of      ASSESSMENT           Discharge Assessment  How are you doing now that you are home?: Patient shares that he is doing well, and is currently back to work. No questions or concerns at this time.  How are your symptoms? (Red Flag symptoms escalate to triage hotline per guidelines): Improved  Do you feel your condition is stable enough to be safe at home until your provider visit?: Yes  Does the patient have their discharge instructions? : Yes  Does the patient have questions regarding their discharge instructions? : No  Were you started on any new medications or were there changes to any of your previous medications? : Yes  Does the patient have all of their medications?: Yes  Do you have questions regarding any of your medications? : No  Do you have all of your needed medical supplies or equipment (DME)?  (i.e. oxygen tank, CPAP, cane, etc.): Yes  Discharge follow-up appointment scheduled within 14 calendar days? : Yes  Discharge Follow Up Appointment Date: 09/15/22  Discharge Follow Up Appointment Scheduled with?: Primary Care Provider    Post-op ([facility] Only)  If the patient had a surgery or procedure, do they have any questions for a nurse?: No    Post-op (Clinicians Only)  Did the patient have surgery or a procedure: No  Fever: No  Chills: No        PLAN                                                      Outpatient Plan:    Follow-up with Dr. aSrkar in 10 to 14 days for suture removal.  No x-rays will be needed at that time.     Follow up with your Primary Care Provider in 7-10 days for hospital admission.          Future Appointments   Date Time Provider Department Center   9/15/2022  2:00 PM Emir Sarkar MD [facility]         For any urgent concerns, please contact our 24 hour nurse triage line:  [phone] ([phone])         MARKUS Coe

## 2022-09-15 ENCOUNTER — OFFICE VISIT (OUTPATIENT)
Dept: ORTHOPEDICS | Facility: CLINIC | Age: 47
End: 2022-09-15

## 2022-09-15 DIAGNOSIS — M71.121 SEPTIC OLECRANON BURSITIS OF RIGHT ELBOW: Primary | ICD-10-CM

## 2022-09-15 PROCEDURE — 99024 POSTOP FOLLOW-UP VISIT: CPT | Performed by: ORTHOPAEDIC SURGERY

## 2022-09-15 NOTE — LETTER
9/15/2022         RE: Yandel Norris  [address]  [facility] [address]        Dear Colleague,    Thank you for referring your patient, Yandel Norris, to the [facility]. Please see a copy of my visit note below.    Follow up I+D right septic olecranon bursitis.  He had 3 open areas initially.  Wound is healing well.  No active infection.  He is still on Keflex, Bactrim.  It is too early to remove sutures, as I excised some of the involved skin.  Return to clinic 1 week to plan suture removal.  Possibly need steri-strips then.          Again, thank you for allowing me to participate in the care of your patient.        Sincerely,        Emir Sarkar MD

## 2022-09-15 NOTE — PROGRESS NOTES
Follow up I+D right septic olecranon bursitis.  He had 3 open areas initially.  Wound is healing well.  No active infection.  He is still on Keflex, Bactrim.  It is too early to remove sutures, as I excised some of the involved skin.  Return to clinic 1 week to plan suture removal.  Possibly need steri-strips then.

## 2022-09-15 NOTE — LETTER
September 15, 2022      Yandel Norris  [address]  [facility] [address]        To Whom It May Concern:    Yandel Norris  was seen on 9/15/22 for follow up of the right elbow. Patient is to remain off work at this time.     Workability will be reassessed in 1 week.      Sincerely,        Emir Sarkar MD

## 2022-09-16 LAB — BACTERIA WND CULT: NORMAL

## 2022-09-22 ENCOUNTER — OFFICE VISIT (OUTPATIENT)
Dept: ORTHOPEDICS | Facility: OTHER | Age: 47
End: 2022-09-22

## 2022-09-22 VITALS — BODY MASS INDEX: 26.54 KG/M2 | RESPIRATION RATE: 19 BRPM | WEIGHT: 185 LBS

## 2022-09-22 DIAGNOSIS — M71.121 SEPTIC OLECRANON BURSITIS OF RIGHT ELBOW: Primary | ICD-10-CM

## 2022-09-22 PROCEDURE — 99024 POSTOP FOLLOW-UP VISIT: CPT | Performed by: ORTHOPAEDIC SURGERY

## 2022-09-22 ASSESSMENT — PAIN SCALES - GENERAL: PAINLEVEL: NO PAIN (1)

## 2022-09-22 NOTE — PROGRESS NOTES
Follow up I+D right septic olecranon bursitis.  He had 3 open areas initially.  2 of these were excised. The remaining one was not full thickness.  Wound is healing well.  No active infection.  Sutures are removed.  Avoid leaning on elbow.  Bandaid to abrasion area, which is scabbed.  Return to clinic 2-3 weeks.  Allow return to work Monday - avoid leaning on elbow.

## 2022-09-22 NOTE — LETTER
September 22, 2022      Yandel Norris  [address] [place] [id number]        To Whom It May Concern:    Yandel Norris  was seen on 9/22/22 for work comp follow up of the right elbow. Patient may return to work on Monday, 9/26/22 with restrictions.     Restrictions:  No leaning on the right elbow    Workability will be reassessed in 2-3 weeks      Sincerely,        Emir Sarkar MD  (electrically signed)

## 2022-10-11 ENCOUNTER — OFFICE VISIT (OUTPATIENT)
Dept: ORTHOPEDICS | Facility: CLINIC | Age: 47
End: 2022-10-11

## 2022-10-11 VITALS
HEART RATE: 95 BPM | WEIGHT: 185 LBS | HEIGHT: 70 IN | BODY MASS INDEX: 26.48 KG/M2 | SYSTOLIC BLOOD PRESSURE: 167 MMHG | DIASTOLIC BLOOD PRESSURE: 96 MMHG

## 2022-10-11 DIAGNOSIS — M71.121 SEPTIC OLECRANON BURSITIS OF RIGHT ELBOW: Primary | ICD-10-CM

## 2022-10-11 DIAGNOSIS — Z02.6 ENCOUNTER RELATED TO WORKER'S COMPENSATION CLAIM: ICD-10-CM

## 2022-10-11 PROCEDURE — 99024 POSTOP FOLLOW-UP VISIT: CPT | Performed by: ORTHOPAEDIC SURGERY

## 2022-10-11 ASSESSMENT — PAIN SCALES - GENERAL: PAINLEVEL: NO PAIN (1)

## 2022-10-11 NOTE — LETTER
October 11, 2022      To Whom It May Concern:    Yandel Norris  was seen on 10/11/22 for work comp follow up of the right elbow. Patient may return to work without restrictions.     Patient may want to consider wearing a hard elbow pad to protect the elbow on the job.     Patient will be reassessed in 4 weeks.       Sincerely,    Emir Sarkar MD

## 2022-10-11 NOTE — PROGRESS NOTES
Follow up I+D right septic olecranon bursitis.  He had 3 open areas initially.  2 of these were excised. The remaining one was not full thickness.  He has part of a vicryl suture sticking up.  This is removed.  Wound is healing slowly.  No active infection.  Avoid leaning on elbow.  Bandaid to abrasion area, which is scabbed.  Allow return to work with regular duty.  He may want to use a protective elbow pad for a while until skin is fully healed.  Return to clinic 1 month.  This is work comp.

## 2022-10-11 NOTE — PATIENT INSTRUCTIONS
OK to work without restrictions  Consider wearing a hard elbow pad on the right elbow at work    NO soaking of the elbow in baths, hot tubs, pools until the skin heals    Follow up in 4 weeks

## 2022-10-11 NOTE — LETTER
October 11, 2022      Yandel Norris  [address] [place], [place] [id number]      To Whom It May Concern:    Yandel Norris  was seen on 10/11/22 for work comp follow up of the right elbow. Patient may return to work without restrictions.    Patient will be reassessed in 4 weeks.       Sincerely,    Emir Sarkar MD

## 2022-10-11 NOTE — LETTER
10/11/2022         RE: Yandel Norris  [address] [place] [id number]        Dear Colleague,    Thank you for referring your patient, Yandel Norris, to the [facility]. Please see a copy of my visit note below.    Follow up I+D right septic olecranon bursitis.  He had 3 open areas initially.  2 of these were excised. The remaining one was not full thickness.  He has part of a vicryl suture sticking up.  This is removed.  Wound is healing slowly.  No active infection.  Avoid leaning on elbow.  Bandaid to abrasion area, which is scabbed.  Allow return to work with regular duty.  He may want to use a protective elbow pad for a while until skin is fully healed.  Return to clinic 1 month.  This is work comp.          Again, thank you for allowing me to participate in the care of your patient.        Sincerely,        Emir Sarkar MD

## (undated) DEVICE — GLOVE ESTEEM BLUE W/NEU-THERA 7.5  2D73PB75

## (undated) DEVICE — DRSG STERI STRIP 1/2X4" R1547

## (undated) DEVICE — SU VICRYL 2-0 CT-2 27" UND J269H

## (undated) DEVICE — DRAPE STOCKINETTE IMPERVIOUS 09"

## (undated) DEVICE — SOL NACL 0.9% IRRIG 1000ML BOTTLE 07138-09

## (undated) DEVICE — GLOVE BIOGEL PI SZ 7.5 40875

## (undated) DEVICE — STRAP STIRRUP W/SLIP 30187-030

## (undated) DEVICE — ESU GROUND PAD UNIVERSAL W/O CORD

## (undated) DEVICE — SOL WATER IRRIG 1000ML BOTTLE 07139-09

## (undated) DEVICE — SU ETHILON 3-0 PS-2 18" 1669H

## (undated) DEVICE — DRSG GAUZE 4X4" TRAY

## (undated) DEVICE — DRAPE STERI TOWEL SM 1000

## (undated) DEVICE — DRAPE MAYO STAND 23X54 8337

## (undated) DEVICE — BNDG ELASTIC 6"X5YDS UNSTERILE 6611-60

## (undated) DEVICE — GLOVE PROTEXIS W/NEU-THERA 8.0  2D73TE80

## (undated) DEVICE — TOURNIQUET SGL BLADDER 18"X4" RED 5921-218-135

## (undated) DEVICE — BNDG COBAN 4"X5YDS STERILE

## (undated) DEVICE — GLOVE PROTEXIS BLUE W/NEU-THERA 8.0  2D73EB80

## (undated) DEVICE — PREP POVIDONE IODINE SOLUTION 10% 4OZ BOTTLE 29906-004

## (undated) DEVICE — GLOVE ESTEEM BLUE W/NEU-THERA 8.0  2D73PB80

## (undated) DEVICE — PREP SKIN SCRUB TRAY 4461A

## (undated) DEVICE — BASIN SET MINOR DISP

## (undated) DEVICE — KIT DRAIN CLOSED WOUND SUCTION MED 400ML RESVR

## (undated) DEVICE — PREP POVIDONE IODINE SCRUB 7.5% 4OZ APL82212

## (undated) DEVICE — SU PROLENE 3-0 PS-2 18" 8687H

## (undated) DEVICE — TOURNIQUET CUFF 18" STERILE

## (undated) DEVICE — SU VICRYL 2-0 SH 27" UND J417H

## (undated) DEVICE — TUBE CULTURE ANAEROBIC PORT-A-CUL 11ML

## (undated) DEVICE — PACK HAND WRIST FOREARM CUSTOM

## (undated) DEVICE — GLOVE PROTEXIS W/NEU-THERA 7.5  2D73TE75

## (undated) DEVICE — SPECIMEN CULTURETTE DBL SWAB 220109

## (undated) DEVICE — SU VICRYL 0 CT-2 27" UND J270H

## (undated) RX ORDER — PROPOFOL 10 MG/ML
INJECTION, EMULSION INTRAVENOUS
Status: DISPENSED
Start: 2022-09-08

## (undated) RX ORDER — CEFAZOLIN SODIUM 1 G/3ML
INJECTION, POWDER, FOR SOLUTION INTRAMUSCULAR; INTRAVENOUS
Status: DISPENSED
Start: 2022-09-08

## (undated) RX ORDER — DIMENHYDRINATE 50 MG/ML
INJECTION, SOLUTION INTRAMUSCULAR; INTRAVENOUS
Status: DISPENSED
Start: 2022-09-08

## (undated) RX ORDER — ONDANSETRON 2 MG/ML
INJECTION INTRAMUSCULAR; INTRAVENOUS
Status: DISPENSED
Start: 2022-09-08

## (undated) RX ORDER — FENTANYL CITRATE 50 UG/ML
INJECTION, SOLUTION INTRAMUSCULAR; INTRAVENOUS
Status: DISPENSED
Start: 2022-09-08

## (undated) RX ORDER — LIDOCAINE HYDROCHLORIDE 20 MG/ML
INJECTION, SOLUTION EPIDURAL; INFILTRATION; INTRACAUDAL; PERINEURAL
Status: DISPENSED
Start: 2022-09-08